# Patient Record
Sex: FEMALE | Race: BLACK OR AFRICAN AMERICAN | NOT HISPANIC OR LATINO
[De-identification: names, ages, dates, MRNs, and addresses within clinical notes are randomized per-mention and may not be internally consistent; named-entity substitution may affect disease eponyms.]

---

## 2017-01-11 ENCOUNTER — CHART COPY (OUTPATIENT)
Age: 37
End: 2017-01-11

## 2017-01-26 ENCOUNTER — FORM ENCOUNTER (OUTPATIENT)
Age: 37
End: 2017-01-26

## 2017-01-27 ENCOUNTER — APPOINTMENT (OUTPATIENT)
Dept: MRI IMAGING | Facility: IMAGING CENTER | Age: 37
End: 2017-01-27

## 2017-01-27 ENCOUNTER — OUTPATIENT (OUTPATIENT)
Dept: OUTPATIENT SERVICES | Facility: HOSPITAL | Age: 37
LOS: 1 days | End: 2017-01-27
Payer: COMMERCIAL

## 2017-01-27 DIAGNOSIS — D25.9 LEIOMYOMA OF UTERUS, UNSPECIFIED: ICD-10-CM

## 2017-01-27 PROCEDURE — A9585: CPT

## 2017-01-27 PROCEDURE — 72197 MRI PELVIS W/O & W/DYE: CPT

## 2017-02-07 ENCOUNTER — APPOINTMENT (OUTPATIENT)
Dept: INTERVENTIONAL RADIOLOGY/VASCULAR | Facility: CLINIC | Age: 37
End: 2017-02-07

## 2017-02-07 VITALS
BODY MASS INDEX: 25.03 KG/M2 | SYSTOLIC BLOOD PRESSURE: 122 MMHG | RESPIRATION RATE: 16 BRPM | OXYGEN SATURATION: 100 % | HEIGHT: 62 IN | HEART RATE: 68 BPM | WEIGHT: 136 LBS | DIASTOLIC BLOOD PRESSURE: 78 MMHG

## 2017-04-06 ENCOUNTER — OUTPATIENT (OUTPATIENT)
Dept: OUTPATIENT SERVICES | Facility: HOSPITAL | Age: 37
LOS: 1 days | End: 2017-04-06
Payer: COMMERCIAL

## 2017-04-06 VITALS
OXYGEN SATURATION: 100 % | SYSTOLIC BLOOD PRESSURE: 109 MMHG | RESPIRATION RATE: 16 BRPM | HEART RATE: 59 BPM | WEIGHT: 136.91 LBS | HEIGHT: 61 IN | DIASTOLIC BLOOD PRESSURE: 72 MMHG | TEMPERATURE: 98 F

## 2017-04-06 DIAGNOSIS — Z01.818 ENCOUNTER FOR OTHER PREPROCEDURAL EXAMINATION: ICD-10-CM

## 2017-04-06 DIAGNOSIS — D25.9 LEIOMYOMA OF UTERUS, UNSPECIFIED: ICD-10-CM

## 2017-04-06 DIAGNOSIS — Z98.891 HISTORY OF UTERINE SCAR FROM PREVIOUS SURGERY: Chronic | ICD-10-CM

## 2017-04-06 LAB
HCT VFR BLD CALC: 34.9 % — SIGNIFICANT CHANGE UP (ref 34.5–45)
HGB BLD-MCNC: 10.9 G/DL — LOW (ref 11.5–15.5)
MCHC RBC-ENTMCNC: 21.8 PG — LOW (ref 27–34)
MCHC RBC-ENTMCNC: 31.2 GM/DL — LOW (ref 32–36)
MCV RBC AUTO: 69.9 FL — LOW (ref 80–100)
PLATELET # BLD AUTO: 191 K/UL — SIGNIFICANT CHANGE UP (ref 150–400)
RBC # BLD: 4.99 M/UL — SIGNIFICANT CHANGE UP (ref 3.8–5.2)
RBC # FLD: 14.4 % — SIGNIFICANT CHANGE UP (ref 10.3–14.5)
WBC # BLD: 5.33 K/UL — SIGNIFICANT CHANGE UP (ref 3.8–10.5)
WBC # FLD AUTO: 5.33 K/UL — SIGNIFICANT CHANGE UP (ref 3.8–10.5)

## 2017-04-06 PROCEDURE — 85027 COMPLETE CBC AUTOMATED: CPT

## 2017-04-06 RX ORDER — SODIUM CHLORIDE 9 MG/ML
3 INJECTION INTRAMUSCULAR; INTRAVENOUS; SUBCUTANEOUS EVERY 8 HOURS
Qty: 0 | Refills: 0 | Status: DISCONTINUED | OUTPATIENT
Start: 2017-04-19 | End: 2017-05-04

## 2017-04-06 RX ORDER — LIDOCAINE HCL 20 MG/ML
0.2 VIAL (ML) INJECTION ONCE
Qty: 0 | Refills: 0 | Status: DISCONTINUED | OUTPATIENT
Start: 2017-04-19 | End: 2017-05-04

## 2017-04-06 RX ORDER — ACETAMINOPHEN 500 MG
975 TABLET ORAL ONCE
Qty: 0 | Refills: 0 | Status: DISCONTINUED | OUTPATIENT
Start: 2017-04-19 | End: 2017-05-04

## 2017-04-06 RX ORDER — CELECOXIB 200 MG/1
200 CAPSULE ORAL ONCE
Qty: 0 | Refills: 0 | Status: DISCONTINUED | OUTPATIENT
Start: 2017-04-19 | End: 2017-05-04

## 2017-04-06 NOTE — H&P PST ADULT - ATTENDING COMMENTS
Pt and  were counseled re resection of submucosal myoma and D+C, using operative hysteroscopy, Symphion system.  r/b/a's of these procedures, including ut perforation, injury to the cervix or uterus, injury to the intra-abd organs or viscerae, infection, excessive bleeding, scarring or Asherman syndrome all discussed and q's answered.  Pt and  undertand and agree.  consents signed.

## 2017-04-06 NOTE — H&P PST ADULT - HISTORY OF PRESENT ILLNESS
This is a 35 y/o female dx: Uterine fibroid. Scheduled: Resection of Submucoal  Fibroid/ Operative Hysteroscopy with Symphion.

## 2017-04-06 NOTE — H&P PST ADULT - REASON FOR ADMISSION
" I have uterine fibroid. I'm trying to conceive" " I have a uterine fibroid. I'm trying to conceive"

## 2017-04-11 ENCOUNTER — APPOINTMENT (OUTPATIENT)
Dept: OPHTHALMOLOGY | Facility: CLINIC | Age: 37
End: 2017-04-11

## 2017-04-18 ENCOUNTER — RESULT REVIEW (OUTPATIENT)
Age: 37
End: 2017-04-18

## 2017-04-19 ENCOUNTER — OUTPATIENT (OUTPATIENT)
Dept: OUTPATIENT SERVICES | Facility: HOSPITAL | Age: 37
LOS: 1 days | End: 2017-04-19
Payer: COMMERCIAL

## 2017-04-19 VITALS
WEIGHT: 136.91 LBS | DIASTOLIC BLOOD PRESSURE: 72 MMHG | OXYGEN SATURATION: 100 % | HEIGHT: 61 IN | TEMPERATURE: 99 F | SYSTOLIC BLOOD PRESSURE: 109 MMHG | HEART RATE: 73 BPM | RESPIRATION RATE: 16 BRPM

## 2017-04-19 VITALS
DIASTOLIC BLOOD PRESSURE: 65 MMHG | OXYGEN SATURATION: 100 % | SYSTOLIC BLOOD PRESSURE: 112 MMHG | RESPIRATION RATE: 18 BRPM | HEART RATE: 61 BPM

## 2017-04-19 DIAGNOSIS — D25.9 LEIOMYOMA OF UTERUS, UNSPECIFIED: ICD-10-CM

## 2017-04-19 DIAGNOSIS — Z98.891 HISTORY OF UTERINE SCAR FROM PREVIOUS SURGERY: Chronic | ICD-10-CM

## 2017-04-19 PROCEDURE — 88305 TISSUE EXAM BY PATHOLOGIST: CPT

## 2017-04-19 PROCEDURE — 88305 TISSUE EXAM BY PATHOLOGIST: CPT | Mod: 26

## 2017-04-19 PROCEDURE — 58561 HYSTEROSCOPY REMOVE MYOMA: CPT

## 2017-04-19 RX ORDER — OXYCODONE HYDROCHLORIDE 5 MG/1
5 TABLET ORAL ONCE
Qty: 0 | Refills: 0 | Status: DISCONTINUED | OUTPATIENT
Start: 2017-04-19 | End: 2017-04-19

## 2017-04-19 RX ORDER — FERROUS SULFATE 325(65) MG
1 TABLET ORAL
Qty: 0 | Refills: 0 | COMMUNITY

## 2017-04-19 RX ORDER — FAMOTIDINE 10 MG/ML
0 INJECTION INTRAVENOUS
Qty: 0 | Refills: 0 | COMMUNITY

## 2017-04-19 RX ORDER — CELECOXIB 200 MG/1
200 CAPSULE ORAL ONCE
Qty: 0 | Refills: 0 | Status: DISCONTINUED | OUTPATIENT
Start: 2017-04-19 | End: 2017-05-04

## 2017-04-19 RX ORDER — SODIUM CHLORIDE 9 MG/ML
1000 INJECTION, SOLUTION INTRAVENOUS
Qty: 0 | Refills: 0 | Status: DISCONTINUED | OUTPATIENT
Start: 2017-04-19 | End: 2017-05-04

## 2017-04-19 RX ORDER — PREGABALIN 225 MG/1
1 CAPSULE ORAL
Qty: 0 | Refills: 0 | COMMUNITY

## 2017-04-19 RX ORDER — FOLIC ACID 0.8 MG
1 TABLET ORAL
Qty: 0 | Refills: 0 | COMMUNITY

## 2017-04-19 RX ORDER — CHOLECALCIFEROL (VITAMIN D3) 125 MCG
1 CAPSULE ORAL
Qty: 0 | Refills: 0 | COMMUNITY

## 2017-04-19 RX ORDER — ONDANSETRON 8 MG/1
4 TABLET, FILM COATED ORAL ONCE
Qty: 0 | Refills: 0 | Status: DISCONTINUED | OUTPATIENT
Start: 2017-04-19 | End: 2017-05-04

## 2017-04-19 NOTE — ASU DISCHARGE PLAN (ADULT/PEDIATRIC). - ACTIVITY LEVEL
nothing per vagina/no tub baths/no tampons/no heavy lifting/no douching/avoid above activities for 2 weeks/no sports/gym/no intercourse

## 2017-04-19 NOTE — ASU DISCHARGE PLAN (ADULT/PEDIATRIC). - NOTIFY
Pain not relieved by Medications/Unable to Urinate/Persistent Nausea and Vomiting/Bleeding that does not stop/Increased Irritability or Sluggishness/GYN Fever>100.4/Inability to Tolerate Liquids or Foods

## 2017-04-19 NOTE — BRIEF OPERATIVE NOTE - OPERATION/FINDINGS
normal external genital organs, vagina and cervix.  Retroverted normal size uterus.  normal adnexal size on b/m exam.   2.5-3cm submucosal myoma noted to arise from posterior aspect of uterus.

## 2017-04-19 NOTE — BRIEF OPERATIVE NOTE - PROCEDURE
Dilatation & curettage  04/19/2017    Active  ADAVIDSO  Myomectomy, uterine, hysteroscopic  04/19/2017    Active  ADAVIDSO

## 2017-04-19 NOTE — ASU DISCHARGE PLAN (ADULT/PEDIATRIC). - MEDICATION SUMMARY - MEDICATIONS TO TAKE
I will START or STAY ON the medications listed below when I get home from the hospital:    ibuprofen 200 mg oral capsule  -- up to 3 cap(s) by mouth every 6 hours, As Needed  -- Indication: For for pain, as needed    acetaminophen 500 mg oral capsule  -- Up to 2 caps / tablets, by mouth , As Needed  -- Indication: For for pain, as needed    multivitamin  -- 1 tab(s) by mouth once a day: Last dose is 4-12-17  -- Indication: For vitamins

## 2017-04-24 LAB — SURGICAL PATHOLOGY STUDY: SIGNIFICANT CHANGE UP

## 2017-05-11 NOTE — H&P PST ADULT - PROBLEM/PLAN-1
Called and spoke to the patient. He prefers to just work with Dr. Haskins. (Appt in place for next week.) This patient no longer wishes to continue following-up with [facility]. We will stop reaching out to the patient at this time. Please let us know if we can assist in this patient's care in the future.    Routing to PCP as REGGIE Hatch, PharmD  [place] [facility] Resident  [phone]    
DISPLAY PLAN FREE TEXT

## 2017-05-15 ENCOUNTER — TRANSCRIPTION ENCOUNTER (OUTPATIENT)
Age: 37
End: 2017-05-15

## 2018-05-10 ENCOUNTER — APPOINTMENT (OUTPATIENT)
Dept: OPHTHALMOLOGY | Facility: CLINIC | Age: 38
End: 2018-05-10
Payer: COMMERCIAL

## 2018-05-10 PROCEDURE — 92015 DETERMINE REFRACTIVE STATE: CPT

## 2018-05-10 PROCEDURE — 92012 INTRM OPH EXAM EST PATIENT: CPT

## 2020-01-24 ENCOUNTER — RESULT REVIEW (OUTPATIENT)
Age: 40
End: 2020-01-24

## 2021-01-27 PROBLEM — E55.9 VITAMIN D DEFICIENCY, UNSPECIFIED: Chronic | Status: ACTIVE | Noted: 2017-04-06

## 2021-01-27 PROBLEM — D50.9 IRON DEFICIENCY ANEMIA, UNSPECIFIED: Chronic | Status: ACTIVE | Noted: 2017-04-06

## 2021-01-27 PROBLEM — D25.9 LEIOMYOMA OF UTERUS, UNSPECIFIED: Chronic | Status: ACTIVE | Noted: 2017-04-06

## 2021-02-17 ENCOUNTER — ASOB RESULT (OUTPATIENT)
Age: 41
End: 2021-02-17

## 2021-02-17 ENCOUNTER — APPOINTMENT (OUTPATIENT)
Dept: ANTEPARTUM | Facility: CLINIC | Age: 41
End: 2021-02-17
Payer: COMMERCIAL

## 2021-02-17 PROCEDURE — 99072 ADDL SUPL MATRL&STAF TM PHE: CPT

## 2021-02-17 PROCEDURE — 76801 OB US < 14 WKS SINGLE FETUS: CPT

## 2021-02-17 PROCEDURE — 76813 OB US NUCHAL MEAS 1 GEST: CPT | Mod: 59

## 2021-03-17 ENCOUNTER — APPOINTMENT (OUTPATIENT)
Dept: ANTEPARTUM | Facility: CLINIC | Age: 41
End: 2021-03-17
Payer: COMMERCIAL

## 2021-03-17 ENCOUNTER — INPATIENT (INPATIENT)
Facility: HOSPITAL | Age: 41
LOS: 1 days | Discharge: HOME | DRG: 831 | End: 2021-03-19
Attending: OBSTETRICS & GYNECOLOGY | Admitting: OBSTETRICS & GYNECOLOGY
Payer: COMMERCIAL

## 2021-03-17 ENCOUNTER — TRANSCRIPTION ENCOUNTER (OUTPATIENT)
Age: 41
End: 2021-03-17

## 2021-03-17 ENCOUNTER — ASOB RESULT (OUTPATIENT)
Age: 41
End: 2021-03-17

## 2021-03-17 VITALS — DIASTOLIC BLOOD PRESSURE: 66 MMHG | SYSTOLIC BLOOD PRESSURE: 126 MMHG | HEART RATE: 125 BPM

## 2021-03-17 DIAGNOSIS — O26.899 OTHER SPECIFIED PREGNANCY RELATED CONDITIONS, UNSPECIFIED TRIMESTER: ICD-10-CM

## 2021-03-17 DIAGNOSIS — Z3A.16 16 WEEKS GESTATION OF PREGNANCY: ICD-10-CM

## 2021-03-17 DIAGNOSIS — Z98.891 HISTORY OF UTERINE SCAR FROM PREVIOUS SURGERY: Chronic | ICD-10-CM

## 2021-03-17 DIAGNOSIS — Z3A.00 WEEKS OF GESTATION OF PREGNANCY NOT SPECIFIED: ICD-10-CM

## 2021-03-17 DIAGNOSIS — Z34.80 ENCOUNTER FOR SUPERVISION OF OTHER NORMAL PREGNANCY, UNSPECIFIED TRIMESTER: ICD-10-CM

## 2021-03-17 LAB
ALBUMIN SERPL ELPH-MCNC: 4.3 G/DL — SIGNIFICANT CHANGE UP (ref 3.3–5)
ALP SERPL-CCNC: 54 U/L — SIGNIFICANT CHANGE UP (ref 40–120)
ALT FLD-CCNC: 13 U/L — SIGNIFICANT CHANGE UP (ref 10–45)
ANION GAP SERPL CALC-SCNC: 15 MMOL/L — SIGNIFICANT CHANGE UP (ref 5–17)
AST SERPL-CCNC: 20 U/L — SIGNIFICANT CHANGE UP (ref 10–40)
BASOPHILS # BLD AUTO: 0 K/UL — SIGNIFICANT CHANGE UP (ref 0–0.2)
BASOPHILS NFR BLD AUTO: 0 % — SIGNIFICANT CHANGE UP (ref 0–2)
BILIRUB SERPL-MCNC: 0.7 MG/DL — SIGNIFICANT CHANGE UP (ref 0.2–1.2)
BUN SERPL-MCNC: 8 MG/DL — SIGNIFICANT CHANGE UP (ref 7–23)
CALCIUM SERPL-MCNC: 10.2 MG/DL — SIGNIFICANT CHANGE UP (ref 8.4–10.5)
CHLORIDE SERPL-SCNC: 102 MMOL/L — SIGNIFICANT CHANGE UP (ref 96–108)
CO2 SERPL-SCNC: 18 MMOL/L — LOW (ref 22–31)
CREAT SERPL-MCNC: 0.48 MG/DL — LOW (ref 0.5–1.3)
EOSINOPHIL # BLD AUTO: 0 K/UL — SIGNIFICANT CHANGE UP (ref 0–0.5)
EOSINOPHIL NFR BLD AUTO: 0 % — SIGNIFICANT CHANGE UP (ref 0–6)
GLUCOSE SERPL-MCNC: 85 MG/DL — SIGNIFICANT CHANGE UP (ref 70–99)
HCT VFR BLD CALC: 38.9 % — SIGNIFICANT CHANGE UP (ref 34.5–45)
HGB BLD-MCNC: 12.3 G/DL — SIGNIFICANT CHANGE UP (ref 11.5–15.5)
LYMPHOCYTES # BLD AUTO: 0.35 K/UL — LOW (ref 1–3.3)
LYMPHOCYTES # BLD AUTO: 3.5 % — LOW (ref 13–44)
MCHC RBC-ENTMCNC: 22.8 PG — LOW (ref 27–34)
MCHC RBC-ENTMCNC: 31.6 GM/DL — LOW (ref 32–36)
MCV RBC AUTO: 72 FL — LOW (ref 80–100)
MONOCYTES # BLD AUTO: 0.53 K/UL — SIGNIFICANT CHANGE UP (ref 0–0.9)
MONOCYTES NFR BLD AUTO: 5.3 % — SIGNIFICANT CHANGE UP (ref 2–14)
NEUTROPHILS # BLD AUTO: 9.15 K/UL — HIGH (ref 1.8–7.4)
NEUTROPHILS NFR BLD AUTO: 91.2 % — HIGH (ref 43–77)
PLATELET # BLD AUTO: 201 K/UL — SIGNIFICANT CHANGE UP (ref 150–400)
POTASSIUM SERPL-MCNC: 3.8 MMOL/L — SIGNIFICANT CHANGE UP (ref 3.5–5.3)
POTASSIUM SERPL-SCNC: 3.8 MMOL/L — SIGNIFICANT CHANGE UP (ref 3.5–5.3)
PROT SERPL-MCNC: 8 G/DL — SIGNIFICANT CHANGE UP (ref 6–8.3)
RBC # BLD: 5.4 M/UL — HIGH (ref 3.8–5.2)
RBC # FLD: 15.5 % — HIGH (ref 10.3–14.5)
SARS-COV-2 RNA SPEC QL NAA+PROBE: SIGNIFICANT CHANGE UP
SODIUM SERPL-SCNC: 135 MMOL/L — SIGNIFICANT CHANGE UP (ref 135–145)
WBC # BLD: 10.03 K/UL — SIGNIFICANT CHANGE UP (ref 3.8–10.5)
WBC # FLD AUTO: 10.03 K/UL — SIGNIFICANT CHANGE UP (ref 3.8–10.5)

## 2021-03-17 PROCEDURE — 76805 OB US >/= 14 WKS SNGL FETUS: CPT

## 2021-03-17 PROCEDURE — 99253 IP/OBS CNSLTJ NEW/EST LOW 45: CPT

## 2021-03-17 PROCEDURE — 99072 ADDL SUPL MATRL&STAF TM PHE: CPT

## 2021-03-17 RX ORDER — SODIUM CHLORIDE 9 MG/ML
1000 INJECTION, SOLUTION INTRAVENOUS
Refills: 0 | Status: DISCONTINUED | OUTPATIENT
Start: 2021-03-17 | End: 2021-03-17

## 2021-03-17 RX ORDER — CITRIC ACID/SODIUM CITRATE 300-500 MG
15 SOLUTION, ORAL ORAL EVERY 6 HOURS
Refills: 0 | Status: DISCONTINUED | OUTPATIENT
Start: 2021-03-17 | End: 2021-03-17

## 2021-03-17 RX ORDER — SODIUM CHLORIDE 9 MG/ML
1000 INJECTION, SOLUTION INTRAVENOUS
Refills: 0 | Status: DISCONTINUED | OUTPATIENT
Start: 2021-03-17 | End: 2021-03-18

## 2021-03-17 RX ORDER — OXYTOCIN 10 UNIT/ML
333.33 VIAL (ML) INJECTION
Qty: 20 | Refills: 0 | Status: DISCONTINUED | OUTPATIENT
Start: 2021-03-17 | End: 2021-03-18

## 2021-03-17 RX ADMIN — SODIUM CHLORIDE 125 MILLILITER(S): 9 INJECTION, SOLUTION INTRAVENOUS at 12:30

## 2021-03-17 NOTE — OB PROVIDER H&P - PROBLEM SELECTOR PLAN 1
A/P 41yo  @ 16w 2 d admitted with pprom/anhydramnios  - Admit to L & D/Labs/IVF/NPO  - MFM consulted  - Family Planning consulted  - Options discussed with patient regarding IOL vs D & E, pt is considering options and waiting for  prior to making her decision  D/W Dr Billy Shipley PA-C

## 2021-03-17 NOTE — OB PROVIDER H&P - HISTORY OF PRESENT ILLNESS
41yo  @ 16w 2 d presents from office for further evaluation secondary to new finding of anhydramnios in the office today.  Pt states that on monday she did have increased thin vaignal discharge after having a BM and since then has had to wear a pad bc she is having increased vaignal discharge.  Denies VB, crmaping or ctx , pt hasnt had FM this pregnancy yet.  Denies fever or chills or other complaints    PNC: Dr Velasco  PNI: uncomplicated  PNL: will obtain if patient admitted     All: NKDA  Meds: PNV, iron  PMHx: Denies  PSHx: C/S, vaginal myomectomy in 2017 and 2020  Socialhx: Denies x 3  OBhx: 2011  FT  C/S  arrest of dilation - 10lbs  GYNhx: + small fibriods per pt - stable, denies ov cysts or STDs    T(C): --  HR: 106 (03-17-21 @ 11:39) (106 - 126)  BP: 126/66 (03-17-21 @ 10:19) (126/66 - 126/66)  RR: --  SpO2: 100% (03-17-21 @ 11:39) (91% - 100%)    Gen: NAd  Abdomen: soft, NT  Ext: no calf tenderness      SSE: + pooling, + nitrazine, + ferning  umbilical cord cord seen protruding through the cervical os  BSUS by Dr Wyatt ivanx, posterior placenta, minimal fluid seen    A/P 41yo  @ 16w 2 d presents with pprom/anhydramnios  - Admit to L & D/Labs/IVF/NPO  - MFM consulted  - Family Planning consulted  D/W Dr Billy Shipley PA-C

## 2021-03-17 NOTE — OB RN PATIENT PROFILE - HEIGHT IN INCHES
Post Anesthetic Evaluation


Cardiovascular Status: Normal, Stable


Respiratory Status: Normal, Stable


Level of Consciousness/Mental Status: Can Participate in Eval


Pain Control: Adequate, Prn Tx Ordered


Nausea/Vomiting Control: Adequate, Prn Tx Ordered


Complications Possibly Related to Anesthesia: None Noted
2

## 2021-03-17 NOTE — OB RN TRIAGE NOTE - CURRENT PREGNANCY COMPLICATIONS, OB PROFILE
Incompetent Cervix/Cervical Insufficiency/ Premature Rupture of Membranes (PPROM)/Abnormal Amniotic Fluid Volume

## 2021-03-17 NOTE — OB PROVIDER H&P - ASSESSMENT
41yo  @ 16w 2 d presents from office for further evaluation secondary to new finding of anhydramnios in the office today.  Pt states that on monday she did have increased thin vaignal discharge after having a BM and since then has had to wear a pad bc she is having increased vaignal discharge.  Denies VB, crmaping or ctx , pt hasnt had FM this pregnancy yet.  Denies fever or chills or other complaints    PNC: Dr Velasco  PNI: uncomplicated  PNL: will obtain if patient admitted     All: NKDA  Meds: PNV, iron  PMHx: Denies  PSHx: C/S, vaginal myomectomy in 2017 and 2020  Socialhx: Denies x 3  OBhx: 2011  FT  C/S  arrest of dilation - 10lbs  GYNhx: + small fibriods per pt - stable, denies ov cysts or STDs    T(C): --  HR: 106 (03-17-21 @ 11:39) (106 - 126)  BP: 126/66 (03-17-21 @ 10:19) (126/66 - 126/66)  RR: --  SpO2: 100% (03-17-21 @ 11:39) (91% - 100%)    Gen: NAd  Abdomen: soft, NT  Ext: no calf tenderness      SSE: + pooling, + nitrazine, + ferning  umbilical cord cord seen protruding through the cervical os  BSUS by Dr Schneider  vtx, posterior placenta, minimal fluid seen

## 2021-03-17 NOTE — OB PROVIDER H&P - ATTENDING COMMENTS
Patient seen at bedside with Nori LIU and Dr. Schneider. Patient reports that she had an episode of feeling increased pressure when she had a BM. She felt a "pop" and noticed a small amount of mucous. Did not see copious fluid on her pad, only discharge. Patient went in for a routine visit and was diagnosed with anhydramnios. Patient was subsequently sent to L&D.     Bedside sono performed by Dr. Schneider confirmed anhydramnios. Spec exam repeated and confirmed cervix appeared at least 1cm dilated with longer segment of umbilical cord in the vagina.     Discussed with patient that pregnancy is currently not viable and she is at risk of infection now that she has rupture of membranes. Patient also counseled that the fetus would not reach viability for 2 months, but lungs are unlikely to develop due to lack of amniotic fluid. Recommend interruption of pregnancy.     Patient counseled on labor induction vs. D&E. Counseled patient on procedure for labor induction and D&E. Patient does not desire to see the fetus and desires to proceed with D&E. Dr. Heredia consulted, who agrees to see patient tomorrow morning for planned D&E tomorrow at 6pm. Patient seen at bedside with Nori LIU and Dr. Schneider. Patient reports that she had an episode of feeling increased pressure when she had a BM. She felt a "pop" and noticed a small amount of mucous. Did not see copious fluid on her pad, only discharge. Patient went in for a routine visit and was diagnosed with anhydramnios. Patient was subsequently sent to L&D.     Bedside sono performed by Dr. Schneider confirmed anhydramnios. Spec exam repeated and confirmed cervix appeared at least 1cm dilated with longer segment of umbilical cord in the vagina.     Discussed with patient that pregnancy is currently not viable and she is at risk of infection now that she has rupture of membranes. Patient also counseled that the fetus would not reach viability for 2 months, but lungs are unlikely to develop due to lack of amniotic fluid. Recommend interruption of pregnancy.     Patient counseled on labor induction vs. D&E. Counseled patient on procedure for labor induction and D&E. Patient does not desire to see the fetus and desires to proceed with D&E. Labs reviewed, WBC 10 and patient has no signs of labor or chorioamnionitis. Dr. Heredia consulted, who agrees to see patient tomorrow morning for planned D&E tomorrow at 6pm. Patient to stay inpatient on 3KN in anticipation of procedure tomorrow.      SHAYY Cervantes MD

## 2021-03-18 ENCOUNTER — APPOINTMENT (OUTPATIENT)
Dept: OBGYN | Facility: CLINIC | Age: 41
End: 2021-03-18

## 2021-03-18 ENCOUNTER — TRANSCRIPTION ENCOUNTER (OUTPATIENT)
Age: 41
End: 2021-03-18

## 2021-03-18 ENCOUNTER — NON-APPOINTMENT (OUTPATIENT)
Age: 41
End: 2021-03-18

## 2021-03-18 LAB
HCT VFR BLD CALC: 30.4 % — LOW (ref 34.5–45)
HGB BLD-MCNC: 9.8 G/DL — LOW (ref 11.5–15.5)
MCHC RBC-ENTMCNC: 23.1 PG — LOW (ref 27–34)
MCHC RBC-ENTMCNC: 32.2 GM/DL — SIGNIFICANT CHANGE UP (ref 32–36)
MCV RBC AUTO: 71.5 FL — LOW (ref 80–100)
NRBC # BLD: 0 /100 WBCS — SIGNIFICANT CHANGE UP (ref 0–0)
PLATELET # BLD AUTO: 185 K/UL — SIGNIFICANT CHANGE UP (ref 150–400)
RBC # BLD: 4.25 M/UL — SIGNIFICANT CHANGE UP (ref 3.8–5.2)
RBC # FLD: 15.6 % — HIGH (ref 10.3–14.5)
T PALLIDUM AB TITR SER: NEGATIVE — SIGNIFICANT CHANGE UP
WBC # BLD: 7.78 K/UL — SIGNIFICANT CHANGE UP (ref 3.8–10.5)
WBC # FLD AUTO: 7.78 K/UL — SIGNIFICANT CHANGE UP (ref 3.8–10.5)

## 2021-03-18 PROCEDURE — 76998 US GUIDE INTRAOP: CPT | Mod: 26

## 2021-03-18 PROCEDURE — 59841 INDUCED ABORTION DILAT&EVAC: CPT

## 2021-03-18 PROCEDURE — 88305 TISSUE EXAM BY PATHOLOGIST: CPT | Mod: 26

## 2021-03-18 PROCEDURE — 99231 SBSQ HOSP IP/OBS SF/LOW 25: CPT | Mod: 25

## 2021-03-18 RX ORDER — HYDROMORPHONE HYDROCHLORIDE 2 MG/ML
0.5 INJECTION INTRAMUSCULAR; INTRAVENOUS; SUBCUTANEOUS
Refills: 0 | Status: DISCONTINUED | OUTPATIENT
Start: 2021-03-18 | End: 2021-03-18

## 2021-03-18 RX ORDER — SODIUM CHLORIDE 9 MG/ML
1000 INJECTION, SOLUTION INTRAVENOUS
Refills: 0 | Status: DISCONTINUED | OUTPATIENT
Start: 2021-03-18 | End: 2021-03-19

## 2021-03-18 RX ORDER — DIPHENOXYLATE HCL/ATROPINE 2.5-.025MG
2 TABLET ORAL ONCE
Refills: 0 | Status: DISCONTINUED | OUTPATIENT
Start: 2021-03-18 | End: 2021-03-19

## 2021-03-18 RX ORDER — MIFEPRISTONE 300 MG/1
200 TABLET, FILM COATED ORAL ONCE
Refills: 0 | Status: COMPLETED | OUTPATIENT
Start: 2021-03-18 | End: 2021-03-18

## 2021-03-18 RX ORDER — ONDANSETRON 8 MG/1
4 TABLET, FILM COATED ORAL ONCE
Refills: 0 | Status: DISCONTINUED | OUTPATIENT
Start: 2021-03-18 | End: 2021-03-18

## 2021-03-18 RX ORDER — ACETAMINOPHEN 500 MG
1000 TABLET ORAL ONCE
Refills: 0 | Status: COMPLETED | OUTPATIENT
Start: 2021-03-18 | End: 2021-03-18

## 2021-03-18 RX ORDER — DIPHENHYDRAMINE HCL 50 MG
25 CAPSULE ORAL ONCE
Refills: 0 | Status: COMPLETED | OUTPATIENT
Start: 2021-03-18 | End: 2021-03-18

## 2021-03-18 RX ADMIN — HYDROMORPHONE HYDROCHLORIDE 0.5 MILLIGRAM(S): 2 INJECTION INTRAMUSCULAR; INTRAVENOUS; SUBCUTANEOUS at 21:00

## 2021-03-18 RX ADMIN — MIFEPRISTONE 200 MILLIGRAM(S): 300 TABLET, FILM COATED ORAL at 09:24

## 2021-03-18 RX ADMIN — HYDROMORPHONE HYDROCHLORIDE 0.5 MILLIGRAM(S): 2 INJECTION INTRAMUSCULAR; INTRAVENOUS; SUBCUTANEOUS at 21:10

## 2021-03-18 RX ADMIN — HYDROMORPHONE HYDROCHLORIDE 0.5 MILLIGRAM(S): 2 INJECTION INTRAMUSCULAR; INTRAVENOUS; SUBCUTANEOUS at 20:41

## 2021-03-18 RX ADMIN — SODIUM CHLORIDE 125 MILLILITER(S): 9 INJECTION, SOLUTION INTRAVENOUS at 21:03

## 2021-03-18 RX ADMIN — SODIUM CHLORIDE 125 MILLILITER(S): 9 INJECTION, SOLUTION INTRAVENOUS at 16:55

## 2021-03-18 RX ADMIN — Medication 25 MILLIGRAM(S): at 01:24

## 2021-03-18 RX ADMIN — HYDROMORPHONE HYDROCHLORIDE 0.5 MILLIGRAM(S): 2 INJECTION INTRAMUSCULAR; INTRAVENOUS; SUBCUTANEOUS at 20:58

## 2021-03-18 RX ADMIN — SODIUM CHLORIDE 125 MILLILITER(S): 9 INJECTION, SOLUTION INTRAVENOUS at 08:45

## 2021-03-18 RX ADMIN — SODIUM CHLORIDE 125 MILLILITER(S): 9 INJECTION, SOLUTION INTRAVENOUS at 00:28

## 2021-03-18 RX ADMIN — HYDROMORPHONE HYDROCHLORIDE 0.5 MILLIGRAM(S): 2 INJECTION INTRAMUSCULAR; INTRAVENOUS; SUBCUTANEOUS at 21:26

## 2021-03-18 NOTE — DISCHARGE NOTE PROVIDER - NSDCMRMEDTOKEN_GEN_ALL_CORE_FT
acetaminophen 500 mg oral capsule: Up to 2 caps / tablets, orally , As Needed  ibuprofen 200 mg oral capsule: up to 3 cap(s) orally every 6 hours, As Needed  multivitamin: 1 tab(s) orally once a day: Last dose is 4-12-17

## 2021-03-18 NOTE — BRIEF OPERATIVE NOTE - NSICDXBRIEFPREOP_GEN_ALL_CORE_FT
PRE-OP DIAGNOSIS:  16 weeks gestation of pregnancy 18-Mar-2021 20:34:36 16 weeks gestation of pregnancy PPROM Colby Grijalva   PRE-OP DIAGNOSIS:   premature rupture of membranes (PPROM) with unknown onset of labor 19-Mar-2021 19:10:45 1. IUP @ 16 3/7 weeks  2. PPROM Alysa Heredia

## 2021-03-18 NOTE — DISCHARGE NOTE PROVIDER - CARE PROVIDER_API CALL
Gaudencio Khan)  Obstetrics and Gynecology  [address] [facility], [address]  [place] [address]  Phone: [phone]  Fax: [phone]  Follow Up Time: 2 weeks

## 2021-03-18 NOTE — PROGRESS NOTE ADULT - ASSESSMENT
A/P: 39 y/o  at 16w3d admitted with PPROM yesterday. VSS overnight. No evidence of infection or labor at this time. Patient awaiting D&E for management of previable PPROM.    Neuro: Analgesia PRN  CV: Hemodynamically stable, 2u PRBCs on hold  Pulm: O2 sat wnl on RA  GI: NPO for OR  : Voiding spontaneously  Heme: DVT ppx: Ambulation, SCDs while in bed, HSQ held pre-operatively  ID: Afebrile, No signs of infection/labor, f/u Vaginal Cx (3/17) recommended by MFM  FEN: LR@125, Replete electrolytes PRN   Dispo: OR for D&E    JOSIAS Brownlee PGY3 A/P: 41 y/o  at 16w3d admitted with PPROM yesterday. VSS overnight. No evidence of infection or labor at this time. Patient awaiting D&E for management of previable PPROM.    Neuro: Analgesia PRN  CV: Hemodynamically stable, 2u PRBCs on hold  Pulm: O2 sat wnl on RA  GI: NPO for OR  : Voiding spontaneously  Heme: DVT ppx: Ambulation, SCDs while in bed, HSQ held pre-operatively. Rh status: positive  ID: Afebrile, No signs of infection/labor, f/u G/C urine, f/u Vaginal Cx (3/17) recommended by MFM  FEN: LR@125, Replete electrolytes PRN   Dispo: OR for D&E    JOSIAS Brownlee PGY3

## 2021-03-18 NOTE — DISCHARGE NOTE PROVIDER - NSDCCPCAREPLAN_GEN_ALL_CORE_FT
PRINCIPAL DISCHARGE DIAGNOSIS  Diagnosis: 16 weeks gestation of pregnancy  Assessment and Plan of Treatment: 16 weeks gestation of pregnancy with PPROM

## 2021-03-18 NOTE — CHART NOTE - NSCHARTNOTEFT_GEN_A_CORE
introduced to patient's partner.  reviewed days events with patient and her partner.  genetics consents signed.  all questions/concerns of patient and her partner.  proceeding to OR.   patient received mifepristone 200 mg this morning.    Alysa Heredia MD introduced to patient's partner.  reviewed days events with patient and her partner.  genetics consents signed.  confirmed GC/CT negative from Dr. Cervantes's office  all questions/concerns of patient and her partner.  proceeding to OR.   patient received mifepristone 200 mg this morning.    Alysa Heredia MD

## 2021-03-18 NOTE — CONSULT NOTE ADULT - ASSESSMENT
39 yo  @ 16 3/7 weeks with PPROM requesting D+E for pregnancy termination  1.Dilation and Evacuation   -All available records and ultrasounds have been reviewed   - MFM u/s showed posterior placenta  - Pt does not desire induction of labor  - reviewed patient’s responsibility to contact insurance company for coverage confirmation  -All consents signed today, all questions/concerns addressed  - Patient offered pamphlet for support services - social work folder given to patient yesterday  - Patient offered fetal footprints - pt requesting  - Genetics - pt desires  - Reviewed disposal of remains, hospital vs. private burial.  Patient desires - hospital disposal    2. Surgery scheduling  - Patient to be precertified for D+E  - D+E scheduled for 6pm    3. ID/Cervical prep  - to get Gc/CT results from private office  - Reviewed Ibuprofen 600 mg po q 6 hours     4. Labs/Blood type  - to get CBC, T+S, at PST’s  - Rhogam pending results    5. Contraception  - Patient counseled on all contraceptive options  - recommend condoms in interime    6. Post-op  - Post-operative phone call virtual visit to be scheduled in 2 weeks  - Post-operative instruction sheet given, reviewed bleeding and infection precautions  - Provided 24 hour contact phone number  - All questions/concerns of patient addressed to their satisfaction

## 2021-03-18 NOTE — BRIEF OPERATIVE NOTE - NSICDXBRIEFPOSTOP_GEN_ALL_CORE_FT
POST-OP DIAGNOSIS:  16 weeks gestation of pregnancy 18-Mar-2021 20:34:43 PPROM Colby Grijalva   POST-OP DIAGNOSIS:   premature rupture of membranes (PPROM) with unknown onset of labor 19-Mar-2021 19:11:06  Alysa Heredia

## 2021-03-18 NOTE — DISCHARGE NOTE PROVIDER - HOSPITAL COURSE
39 y/o  at 16w3d admitted with PPROM now s/p Dilation and Evacuation With Ultrasound Guidance. , see operative note for details. Upon discharge, patient was ambulating, voiding spontaneously and pain was well controlled.

## 2021-03-18 NOTE — BRIEF OPERATIVE NOTE - OPERATION/FINDINGS
16 week myomatous uterus on examination  All fetal parts accounted for specific for gestational age  TXA, Methergine, Hemabate, and pitocin given for uterine tone  Thin endometrial stripe seen on ultrasound after complete of case 16 week anteverted, myomatous uterus. TXA, Methergine, Hemabate, and pitocin administered. fetus and placenta AGA, all fetal parts accounted for.  BT: O+

## 2021-03-18 NOTE — DISCHARGE NOTE PROVIDER - NSDCFUADDINST_GEN_ALL_CORE_FT
Regular diet as tolerated, regular activity as tolerated, no heavy lifting for first two weeks.  Nothing per vagina: no intercourse, tampons or douching. No submerging. Call your provider if you experience fevers, chills, worsening abdominal pain, inability to urinate or worsening vaginal bleeding.  Follow up with your provider in 2 weeks.

## 2021-03-18 NOTE — CONSULT NOTE ADULT - SUBJECTIVE AND OBJECTIVE BOX
39 yo  @ 16 3/7 weeks s/p PPROM 3/17/21.  Introduced to patient.  on exam yesterday, cervix 1 cm dilated.  placenta posterior    all: NKDA  meds: PNV    Obhx:  2011 FT c/s for failure to dilate, arrest 10lb    GYNhx:   vaginal myomectomy  2017: vaginal myomectomy  denies abnl pap smears  denies cysts/endometriosis  denies STIs    surghx  c/s, vaginal myomectomy x2    famh: mother, father HTN    D+E Counseling    Options for the pregnancy were discussed with the patient, including continuation of pregnancy, labor induction, and dilation and evacuation (D&E). Given the extremely poor prognosis for fetal outcome, the patient would prefer not to continue the pregnancy.  They do not desire a labor induction and are requesting a D&E.  Patient aware specimen does not come out intact.  Risks of D&E includin.	Infection: Patient was counseled on risk of infection and the use of prophylactic antibiotics, signs/symptoms of pre- and post-operative infection were reviewed.   2.	Hemorrhage: Patient was counseled on the risk of hemorrhage, possibly requiring blood (and/or blood products) transfusion, management including use of uterotonic medications, possible use of uterine balloon tamponade, possible use of interventional radiology uterine artery embolization, and possible hysterectomy.  If patient has had prior surgeries, their risk of injury to abdominopelvic organs associated with hysterectomy increases.  3.	Trauma: Patient was counseled on the risk of trauma to vagina, cervix, fallopian tubes, ovaries or uterus, possibly requiring laparoscopy, laparotomy, abdomino-pelvic organ repair and/or removal of affected organ(s).  This includes possible hysterectomy and was reviewed in detail.  The patient was counseled on the small risk of uterine perforation and the risk of injury to the vagina, cervix, uterus, fallopian tubes or ovaries, rectum, bowel (small and large intestine), bladder, ureters, pelvic nerves and blood vessels.    The patient was also counseled on the small risk of the need for further surgery (small risk of possible retained products of conception, small risk of scar tissue inside the uterus) and of the risk, as with any surgical procedure, of death.    The risk of harm to subsequent pregnancies or the ability to carry a subsequent pregnancy to term, and adverse psychological effects were discussed.      Need for cervical ripening with misoprostol, mifepristone, pre-operative laminaria placement, and administration of intra-amniotic or intra-fetal KCl or digoxin were also discussed; the accompanying risks of infection, bleeding, injury, rupture of membranes, and  labor were reviewed. The risks of delivery of a severely premature infant were reviewed.  They understand these risks and agrees to above.  The patient does have any underlying medical conditions that would increase her risks associated with the procedure. These conditions are: advanced maternal age, vaginal myomectomies, prior  section, PPROM. The additional risks of the procedure are: increased risk of infection, hemorrhage and trauma.    The patient also understands it is their responsibility to bring to the attention of their physician any unusual symptoms following the  and to report to follow-up examinations.      They are sure of their decision and deny any coercion from family, friends or healthcare providers. The patient had the opportunity to ask questions and all questions were answered.

## 2021-03-18 NOTE — BRIEF OPERATIVE NOTE - NSICDXBRIEFPROCEDURE_GEN_ALL_CORE_FT
PROCEDURES:  Induced  by dilation and evacuation 18-Mar-2021 20:31:17  Colby Grijalva   PROCEDURES:  Induced  by dilation and evacuation 18-Mar-2021 20:31:17 1. examination under anesthesia  2. standard dilation and evacuation under ultrasound guidance Colby Grijalva

## 2021-03-18 NOTE — PRE-ANESTHESIA EVALUATION ADULT - SPO2 (%)
Subjective   Patient ID: Leopoldo Murrieta is a 81year old female. Chief Complaint   Patient presents with   â¢ Knee     knees   â¢ Eye Problem   â¢ Convey Results     HPI        c/o chronic knees pain and stiffness; R>L  Had steroid injections and it did not help  takes Tramadol and it helps a little  She does not want surgery  ambulates with cane  No previous  falls  Â     Glaucoma   able to get around with cane residue vision on left eye and legally blind on right eye  on multiple medications, use eyedrop regularly  sees ophthalmologist  [place]  Â   Hypertension  controlled with low Na diet  no CP or SOB  Â   Hyperlipidemia  on low cholesterol diet  exercising in [employer] 2x a week  BS is 95, which is normal  Â   Hx of left Breast cancer/lumpectomy 7 y ago  has intermittent, chronic sharp pain in one or another breasts  had dx mammogram in July, 2018  not on hormonal th  sees oncologist  No palpitation or pain      Mild, intermittent acid reflux. Monitoring diet and takes otc antacids OTC    Itchiness in vaginal area (outside)  No rashes. No discharge. Â     Â   c/o increased urinary frequency and urgency, chronic. Normal urine appearance, no pain with urination. Does not need any med. for that. Improved since last visit     Obesity  lost 6 p since last visit  monitoring diet     doing exercise in [place] once a week          Leopoldo Murrieta has a past medical history of Breast cancer (CMS/[facility]), Glaucoma, History of lumpectomy of right breast, cholecystectomy, and Osteoarthritis of both knees. Leopoldo Murrieta does not have a problem list on file. Leopoldo Murrieta has a past surgical history that includes Cholecystectomy; Breast lumpectomy (Left); glaucoma surg,iridencleisis (Right); and Breast lumpectomy (Left). Her family history includes Diabetes in her maternal aunt, maternal uncle, and mother; Ophthalmology in her father. Leopoldo Murrieta reports that  has never smoked.  she has never used smokeless tobacco. She reports that she drinks alcohol. She reports that she does not use drugs. Sandrine Young has a current medication list which includes the following prescription(s): brimonidine, acetazolamide, bimatoprost, difluprednate, pilocarpine, and acetaminophen. Tara   Current Outpatient Medications   Medication Sig Dispense Refill   â¢ brimonidine (ALPHAGAN P) 0.1 % ophthalmic solution      â¢ acetaZOLAMIDE (DIAMOX) 250 MG tablet      â¢ bimatoprost (LUMIGAN) 0.01 % ophthalmic solution      â¢ difluprednate (DUREZOL) 0.05 % ophthalmic emulsion      â¢ pilocarpine (ISOPTO CARPINE) 1 % ophthalmic solution      â¢ acetaminophen (TYLENOL) 500 MG tablet Take 1 tablet by mouth 3 times daily as needed for Pain. 90 tablet 3     No current facility-administered medications for this visit. Sandrine Young is allergic to iodine   (environmental or med). Review of Systems   Musculoskeletal: Positive for arthralgias (both knee). All other systems reviewed and are negative. Objective   Physical Exam   Constitutional: She is oriented to person, place, and time. She appears well-developed and well-nourished. No distress. Obese; urine odor   HENT:   Head: Atraumatic. Nose: Nose normal.   Mouth/Throat: Oropharynx is clear and moist.   Eyes: Conjunctivae are normal. Pupils are equal, round, and reactive to light. Right eye exhibits no discharge. Left eye exhibits no discharge. No scleral icterus. Neck: Neck supple. No JVD present. No thyromegaly present. Cardiovascular: Normal rate, regular rhythm, normal heart sounds and intact distal pulses. Exam reveals no gallop and no friction rub. No murmur heard. Pulmonary/Chest: Effort normal and breath sounds normal. No respiratory distress. She has no wheezes. She has no rales. She exhibits no tenderness. Scan due to lumpectomy on left breat   Abdominal: Soft. Bowel sounds are normal. She exhibits no distension and no mass. There is no tenderness. There is no rebound and no guarding. No hernia. Musculoskeletal: She exhibits no edema, tenderness or deformity. Right knee: She exhibits decreased range of motion. Left knee: She exhibits decreased range of motion. DJD  In knees   Lymphadenopathy:     She has no cervical adenopathy. Neurological: She is alert and oriented to person, place, and time. No cranial nerve deficit or sensory deficit. She exhibits normal muscle tone. Coordination normal.   Skin: No rash noted. She is not diaphoretic. No erythema. Psychiatric: She has a normal mood and affect. Her behavior is normal.   Vitals reviewed.        Orders Only on 01/14/2019   Component Date Value Ref Range Status   â¢ FASTING STATUS 01/14/2019 UNK  hrs Final   â¢ CHOLESTEROL 01/14/2019 199  <200 mg/dL Final    Comment:    Desirable            <200  Borderline High      200 to 239  High                 >=240        â¢ CALCULATED LDL 01/14/2019 103  <130 mg/dL Final    Comment: OPTIMAL               <100  NEAR OPTIMAL          100-129  BORDERLINE HIGH       130-159  HIGH                  160-189  VERY HIGH             >=190     â¢ HDL 01/14/2019 78  >49 mg/dL Final    Comment: Low            <40  Borderline Low 40 to 49  Near Optimal   50 to 59  Optimal        >=60     â¢ TRIGLYCERIDE 01/14/2019 89  <150 mg/dL Final    Comment: Normal                   <150  Borderline High          150 to 199  High                     200 to 499  Very High                >=500     â¢ CALCULATED NON HDL 01/14/2019 121  mg/dL Final    Comment:    Therapeutic Target:  CHD and risk equivalents <130  Multiple risk factors    <160  0 to 1 risk factors      <190        â¢ CHOL/HDL 01/14/2019 2.6  <4.5 Final   â¢ Fasting Status 01/14/2019 UNK  hrs Final   â¢ Sodium 01/14/2019 143  135 - 145 mmol/L Final   â¢ Potassium 01/14/2019 3.8  3.4 - 5.1 mmol/L Final   â¢ Chloride 01/14/2019 108* 98 - 107 mmol/L Final   â¢ Carbon Dioxide 01/14/2019 27  21 - 32 mmol/L Final   â¢ Anion Gap 01/14/2019 12  10 - 20 mmol/L Final   â¢ Glucose 01/14/2019 95  65 - 99 mg/dL Final   â¢ BUN 01/14/2019 18  6 - 20 mg/dL Final   â¢ Creatinine 01/14/2019 0.79  0.51 - 0.95 mg/dL Final   â¢ GFR Estimate,  01/14/2019 79   Final    eGFR 60 - 89 mL/min/1.73m2 = Mild decrease in kidney function. â¢ GFR Estimate, Non  01/14/2019 68   Final    eGFR 60 - 89 mL/min/1.73m2 = Mild decrease in kidney function. â¢ BUN/Creatinine Ratio 01/14/2019 23  7 - 25 Final   â¢ CALCIUM 01/14/2019 9.6  8.4 - 10.2 mg/dL Final   â¢ TOTAL BILIRUBIN 01/14/2019 0.8  0.2 - 1.0 mg/dL Final   â¢ AST/SGOT 01/14/2019 13  <38 Units/L Final   â¢ ALT/SGPT 01/14/2019 13  <79 Units/L Final   â¢ ALK PHOSPHATASE 01/14/2019 58  45 - 117 Units/L Final   â¢ TOTAL PROTEIN 01/14/2019 7.8  6.4 - 8.2 g/dL Final   â¢ Albumin 01/14/2019 3.7  3.6 - 5.1 g/dL Final   â¢ GLOBULIN 01/14/2019 4.1* 2.0 - 4.0 g/dL Final   â¢ A/G Ratio, Serum 01/14/2019 0.9* 1.0 - 2.4 Final     Assessment         Advanced OA in knees  steroid injection did not help anymore   refused TKA and ortho f/u  does not want a different pain med. doing exercise in light [place]  fall precaution advised  ambulates with cane  Recommend take vitamin D and calcium       Recommend tylenol 500 MG TID        If tylenol does not help in month, call PCP and may proceed Cortizone injection. Glaucoma/Legally blind  takes multiple eye drops and Acetazolamide  had multiple surgeries  she is participating in exercise program organized by [employer]  sees ophthalmologist  Residue vision on left eye and legally blind on right eye    Hx of left Breast cancer , Bill Mastodynia: had lumpectomy and RT  Need to follow up with Oncologist  Last mammogram  Is done on 7/16/18. Hyperlipidemia  takes Omega 3  on low chol. diet  exercising regularly  BS is 90 from recent lab   Â   GERD  mild, intermittent   cont.  GERD diet  take otc antacids PRN    Urge incontinence/Overactive bladder  Improved since last visit  scheduled voiding      Had colonoscopy in Dec. 2016: tubular adenoma removed  recommended 3 y f/u  increase fibers  Â   Obesity  Exercise regularly   advised re low CH diet  needs to lose 10 % of BW in one year 96

## 2021-03-19 ENCOUNTER — TRANSCRIPTION ENCOUNTER (OUTPATIENT)
Age: 41
End: 2021-03-19

## 2021-03-19 ENCOUNTER — NON-APPOINTMENT (OUTPATIENT)
Age: 41
End: 2021-03-19

## 2021-03-19 VITALS
DIASTOLIC BLOOD PRESSURE: 67 MMHG | HEART RATE: 71 BPM | OXYGEN SATURATION: 99 % | SYSTOLIC BLOOD PRESSURE: 105 MMHG | RESPIRATION RATE: 18 BRPM | TEMPERATURE: 98 F

## 2021-03-19 LAB
CULTURE RESULTS: SIGNIFICANT CHANGE UP
SPECIMEN SOURCE: SIGNIFICANT CHANGE UP

## 2021-03-19 PROCEDURE — G0463: CPT

## 2021-03-19 PROCEDURE — 86769 SARS-COV-2 COVID-19 ANTIBODY: CPT

## 2021-03-19 PROCEDURE — 80053 COMPREHEN METABOLIC PANEL: CPT

## 2021-03-19 PROCEDURE — 87070 CULTURE OTHR SPECIMN AEROBIC: CPT

## 2021-03-19 PROCEDURE — 87635 SARS-COV-2 COVID-19 AMP PRB: CPT

## 2021-03-19 PROCEDURE — 88264 CHROMOSOME ANALYSIS 20-25: CPT

## 2021-03-19 PROCEDURE — 86901 BLOOD TYPING SEROLOGIC RH(D): CPT

## 2021-03-19 PROCEDURE — 88305 TISSUE EXAM BY PATHOLOGIST: CPT

## 2021-03-19 PROCEDURE — 86900 BLOOD TYPING SEROLOGIC ABO: CPT

## 2021-03-19 PROCEDURE — 88233 TISSUE CULTURE SKIN/BIOPSY: CPT

## 2021-03-19 PROCEDURE — 85025 COMPLETE CBC W/AUTO DIFF WBC: CPT

## 2021-03-19 PROCEDURE — 86850 RBC ANTIBODY SCREEN: CPT

## 2021-03-19 PROCEDURE — 88280 CHROMOSOME KARYOTYPE STUDY: CPT

## 2021-03-19 PROCEDURE — 85027 COMPLETE CBC AUTOMATED: CPT

## 2021-03-19 PROCEDURE — 81229 CYTOG ALYS CHRML ABNR SNPCGH: CPT

## 2021-03-19 PROCEDURE — 86780 TREPONEMA PALLIDUM: CPT

## 2021-03-19 RX ORDER — ACETAMINOPHEN 500 MG
975 TABLET ORAL ONCE
Refills: 0 | Status: COMPLETED | OUTPATIENT
Start: 2021-03-19 | End: 2021-03-19

## 2021-03-19 RX ADMIN — Medication 975 MILLIGRAM(S): at 12:32

## 2021-03-19 RX ADMIN — Medication 975 MILLIGRAM(S): at 10:59

## 2021-03-19 NOTE — DISCHARGE NOTE NURSING/CASE MANAGEMENT/SOCIAL WORK - PATIENT PORTAL LINK FT
You can access the FollowMyHealth Patient Portal offered by [facility] by registering at the following website: [email or web address]. By joining [employer]’s FollowMyHealth portal, you will also be able to view your health information using other applications (apps) compatible with our system.

## 2021-03-19 NOTE — PROGRESS NOTE ADULT - SUBJECTIVE AND OBJECTIVE BOX
R3 Antepartum Note, HD#1    Interval events: none.    Patient seen and examined at bedside, no acute overnight events. No acute complaints. Pt denies LOF, VB, ctx, HA, epigastric pain, blurred vision, CP, SOB, N/V, fevers, and chills.    Vital Signs Last 24 Hours  T(C): 36.7 (03-18-21 @ 05:26), Max: 37.5 (03-17-21 @ 12:29)  HR: 79 (03-18-21 @ 05:26) (79 - 139)  BP: 116/76 (03-18-21 @ 05:26) (103/55 - 126/66)  RR: 18 (03-18-21 @ 05:26) (18 - 18)  SpO2: 99% (03-18-21 @ 05:26) (90% - 100%)    CAPILLARY BLOOD GLUCOSE      Physical Exam:  General: NAD  Abdomen: Soft, non-tender, gravid  Ext: No pain or swelling    Labs:             12.3   10.03 )-----------( 201      ( 03-17 @ 12:47 )             38.9     03-17 @ 12:47    135  |  102  |  8   ----------------------------<  85  3.8   |  18  |  0.48    Ca    10.2      03-17 @ 12:47    TPro  8.0  /  Alb  4.3  /  TBili  0.7  /  DBili  x   /  AST  20  /  ALT  13  /  AlkPhos  54  03-17 @ 12:47            MEDICATIONS  (STANDING):  lactated ringers. 1000 milliLiter(s) (125 mL/Hr) IV Continuous <Continuous>    MEDICATIONS  (PRN):  
R1 BREN Progress Note  POD#1   HD#2    Patient seen and examined at bedside.  No acute events overnight. No acute complaints.  Pain well controlled. Patient is ambulating and tolerating regular diet. Pt is passing flatus.  Patient is voiding spontaneously. Denies CP, SOB, N/V, fevers, and chills.    Vital Signs Last 24 Hours  T(C): 36.9 (03-19-21 @ 05:15), Max: 37 (03-18-21 @ 17:00)  HR: 75 (03-19-21 @ 05:15) (57 - 108)  BP: 99/65 (03-19-21 @ 05:15) (97/59 - 119/73)  RR: 18 (03-19-21 @ 05:15) (14 - 18)  SpO2: 98% (03-19-21 @ 05:15) (96% - 100%)    I&O's Summary    17 Mar 2021 07:01  -  18 Mar 2021 07:00  --------------------------------------------------------  IN: 125 mL / OUT: 300 mL / NET: -175 mL    18 Mar 2021 07:01  -  19 Mar 2021 06:38  --------------------------------------------------------  IN: 1125 mL / OUT: 1000 mL / NET: 125 mL        Physical Exam:  General: NAD  CV: RR, S1, S2, no M/R/G  Lungs: CTA b/l, good air flow b/l   Abdomen: Soft, appropriately tender, normoactive bowel sounds  : minimal bleeding on pad  Ext: No pain or swelling     Labs:                        9.8    7.78  )-----------( 185      ( 18 Mar 2021 06:41 )             30.4   baso x      eos x      imm gran x      lymph x      mono x      poly x                            12.3   10.03 )-----------( 201      ( 17 Mar 2021 12:47 )             38.9   baso 0.0    eos 0.0    imm gran x      lymph 3.5    mono 5.3    poly 91.2       MEDICATIONS  (STANDING):  diphenoxylate/atropine 2 Tablet(s) Oral once  lactated ringers. 1000 milliLiter(s) (125 mL/Hr) IV Continuous <Continuous>    MEDICATIONS  (PRN):

## 2021-03-19 NOTE — DISCHARGE NOTE NURSING/CASE MANAGEMENT/SOCIAL WORK - NSDCPNINST_GEN_ALL_CORE
See doctor for post care in two weeks. Nothing per vagina: no douching, tampons, and intercourse. Call sooner if having: fever, increase vaginal bleeding, vaginal odor, or any questions or concerns.   Take pain medications when need. Continue hand washing, wear face mask and social distance.

## 2021-03-19 NOTE — PROGRESS NOTE ADULT - ATTENDING COMMENTS
I personally saw and evaluated pt and agree with Dr Brownlee' assessment and plan.  PPROM  at 16wks now stable.  Dr Heredia's note appreciated pt is scheduled for D+E later this afternoon.
Patient s/p d and e. Doing well physically. Feeling sad but appropriate  She will follow up with Dr. Heredia via telemed as directed   She asks for note for work which will be sent from office.  Office follow up with office.

## 2021-03-19 NOTE — PROGRESS NOTE ADULT - PROBLEM SELECTOR PLAN 1
Neuro: PO pain meds   CV: Hemodynamically stable  Pulm: Saturating well on room air, encourage oob/amb  GI:  Continue regular diet  : voiding spontaneously   Heme: c/w HSQ and SCDs for DVT ppx  ID: Afebrile  Endo: No active issues   Dispo: discharge planning    Kimberly Beckham PGY1  seen with GYN team

## 2021-03-22 ENCOUNTER — NON-APPOINTMENT (OUTPATIENT)
Age: 41
End: 2021-03-22

## 2021-04-06 LAB — CHROM ANALY OVERALL INTERP SPEC-IMP: SIGNIFICANT CHANGE UP

## 2021-04-07 ENCOUNTER — APPOINTMENT (OUTPATIENT)
Dept: OBGYN | Facility: CLINIC | Age: 41
End: 2021-04-07
Payer: COMMERCIAL

## 2021-04-07 DIAGNOSIS — D25.9 LEIOMYOMA OF UTERUS, UNSPECIFIED: ICD-10-CM

## 2021-04-07 DIAGNOSIS — Z87.59 PERSONAL HISTORY OF OTHER COMPLICATIONS OF PREGNANCY, CHILDBIRTH AND THE PUERPERIUM: ICD-10-CM

## 2021-04-07 PROCEDURE — 99212 OFFICE O/P EST SF 10 MIN: CPT | Mod: 95

## 2021-05-01 LAB — SURGICAL PATHOLOGY STUDY: SIGNIFICANT CHANGE UP

## 2021-05-17 ENCOUNTER — NON-APPOINTMENT (OUTPATIENT)
Age: 41
End: 2021-05-17

## 2021-05-26 ENCOUNTER — NON-APPOINTMENT (OUTPATIENT)
Age: 41
End: 2021-05-26

## 2021-05-27 ENCOUNTER — ASOB RESULT (OUTPATIENT)
Age: 41
End: 2021-05-27

## 2021-05-27 ENCOUNTER — APPOINTMENT (OUTPATIENT)
Dept: ANTEPARTUM | Facility: CLINIC | Age: 41
End: 2021-05-27
Payer: COMMERCIAL

## 2021-05-27 VITALS
BODY MASS INDEX: 24.66 KG/M2 | HEIGHT: 62 IN | WEIGHT: 134 LBS | DIASTOLIC BLOOD PRESSURE: 69 MMHG | SYSTOLIC BLOOD PRESSURE: 104 MMHG | HEART RATE: 66 BPM

## 2021-05-27 PROCEDURE — 99072 ADDL SUPL MATRL&STAF TM PHE: CPT

## 2021-05-27 PROCEDURE — 99214 OFFICE O/P EST MOD 30 MIN: CPT

## 2022-04-03 ENCOUNTER — EMERGENCY (EMERGENCY)
Facility: HOSPITAL | Age: 42
LOS: 1 days | Discharge: HOME | End: 2022-04-03
Attending: EMERGENCY MEDICINE | Admitting: EMERGENCY MEDICINE
Payer: COMMERCIAL

## 2022-04-03 VITALS
OXYGEN SATURATION: 100 % | SYSTOLIC BLOOD PRESSURE: 108 MMHG | TEMPERATURE: 98 F | RESPIRATION RATE: 21 BRPM | DIASTOLIC BLOOD PRESSURE: 69 MMHG | HEART RATE: 87 BPM

## 2022-04-03 VITALS
OXYGEN SATURATION: 100 % | HEIGHT: 62 IN | DIASTOLIC BLOOD PRESSURE: 75 MMHG | RESPIRATION RATE: 16 BRPM | TEMPERATURE: 98 F | HEART RATE: 73 BPM | SYSTOLIC BLOOD PRESSURE: 134 MMHG

## 2022-04-03 DIAGNOSIS — Z98.891 HISTORY OF UTERINE SCAR FROM PREVIOUS SURGERY: Chronic | ICD-10-CM

## 2022-04-03 LAB
ALBUMIN SERPL ELPH-MCNC: 4.3 G/DL — SIGNIFICANT CHANGE UP (ref 3.3–5)
ALP SERPL-CCNC: 49 U/L — SIGNIFICANT CHANGE UP (ref 40–120)
ALT FLD-CCNC: 11 U/L — SIGNIFICANT CHANGE UP (ref 4–33)
ANION GAP SERPL CALC-SCNC: 16 MMOL/L — HIGH (ref 7–14)
AST SERPL-CCNC: 18 U/L — SIGNIFICANT CHANGE UP (ref 4–32)
BASOPHILS # BLD AUTO: 0.06 K/UL — SIGNIFICANT CHANGE UP (ref 0–0.2)
BASOPHILS NFR BLD AUTO: 0.9 % — SIGNIFICANT CHANGE UP (ref 0–2)
BILIRUB SERPL-MCNC: 0.6 MG/DL — SIGNIFICANT CHANGE UP (ref 0.2–1.2)
BUN SERPL-MCNC: 14 MG/DL — SIGNIFICANT CHANGE UP (ref 7–23)
CALCIUM SERPL-MCNC: 8.9 MG/DL — SIGNIFICANT CHANGE UP (ref 8.4–10.5)
CHLORIDE SERPL-SCNC: 103 MMOL/L — SIGNIFICANT CHANGE UP (ref 98–107)
CO2 SERPL-SCNC: 19 MMOL/L — LOW (ref 22–31)
CREAT SERPL-MCNC: 0.82 MG/DL — SIGNIFICANT CHANGE UP (ref 0.5–1.3)
EGFR: 93 ML/MIN/1.73M2 — SIGNIFICANT CHANGE UP
EOSINOPHIL # BLD AUTO: 0 K/UL — SIGNIFICANT CHANGE UP (ref 0–0.5)
EOSINOPHIL NFR BLD AUTO: 0 % — SIGNIFICANT CHANGE UP (ref 0–6)
GLUCOSE SERPL-MCNC: 86 MG/DL — SIGNIFICANT CHANGE UP (ref 70–99)
HCT VFR BLD CALC: 38 % — SIGNIFICANT CHANGE UP (ref 34.5–45)
HGB BLD-MCNC: 11.9 G/DL — SIGNIFICANT CHANGE UP (ref 11.5–15.5)
IANC: 5.06 K/UL — SIGNIFICANT CHANGE UP (ref 1.8–7.4)
LYMPHOCYTES # BLD AUTO: 0.81 K/UL — LOW (ref 1–3.3)
LYMPHOCYTES # BLD AUTO: 12.7 % — LOW (ref 13–44)
MCHC RBC-ENTMCNC: 22.5 PG — LOW (ref 27–34)
MCHC RBC-ENTMCNC: 31.3 GM/DL — LOW (ref 32–36)
MCV RBC AUTO: 71.8 FL — LOW (ref 80–100)
MONOCYTES # BLD AUTO: 0.41 K/UL — SIGNIFICANT CHANGE UP (ref 0–0.9)
MONOCYTES NFR BLD AUTO: 6.4 % — SIGNIFICANT CHANGE UP (ref 2–14)
NEUTROPHILS # BLD AUTO: 5.01 K/UL — SIGNIFICANT CHANGE UP (ref 1.8–7.4)
NEUTROPHILS NFR BLD AUTO: 78.2 % — HIGH (ref 43–77)
PLATELET # BLD AUTO: 238 K/UL — SIGNIFICANT CHANGE UP (ref 150–400)
POTASSIUM SERPL-MCNC: 4.2 MMOL/L — SIGNIFICANT CHANGE UP (ref 3.5–5.3)
POTASSIUM SERPL-SCNC: 4.2 MMOL/L — SIGNIFICANT CHANGE UP (ref 3.5–5.3)
PROT SERPL-MCNC: 7.3 G/DL — SIGNIFICANT CHANGE UP (ref 6–8.3)
RBC # BLD: 5.29 M/UL — HIGH (ref 3.8–5.2)
RBC # FLD: 14.6 % — HIGH (ref 10.3–14.5)
SODIUM SERPL-SCNC: 138 MMOL/L — SIGNIFICANT CHANGE UP (ref 135–145)
TROPONIN T, HIGH SENSITIVITY RESULT: <6 NG/L — SIGNIFICANT CHANGE UP
WBC # BLD: 6.41 K/UL — SIGNIFICANT CHANGE UP (ref 3.8–10.5)
WBC # FLD AUTO: 6.41 K/UL — SIGNIFICANT CHANGE UP (ref 3.8–10.5)

## 2022-04-03 PROCEDURE — 71046 X-RAY EXAM CHEST 2 VIEWS: CPT | Mod: 26

## 2022-04-03 PROCEDURE — 99285 EMERGENCY DEPT VISIT HI MDM: CPT

## 2022-04-03 RX ORDER — SODIUM CHLORIDE 9 MG/ML
1000 INJECTION INTRAMUSCULAR; INTRAVENOUS; SUBCUTANEOUS ONCE
Refills: 0 | Status: COMPLETED | OUTPATIENT
Start: 2022-04-03 | End: 2022-04-03

## 2022-04-03 RX ADMIN — SODIUM CHLORIDE 1000 MILLILITER(S): 9 INJECTION INTRAMUSCULAR; INTRAVENOUS; SUBCUTANEOUS at 13:09

## 2022-04-03 NOTE — ED PROVIDER NOTE - OBJECTIVE STATEMENT
40 y/o F presents to the ED w/ near syncopal episode. States she was in [employer] this morning and was hot wearing a coat. Initially she was standing and started feeling lightheaded and clammy and went down to her knees as part of the ceremony and shortly after started to lean over and almost passed out. Pt was caught by several bystanders who elevated her legs and her symptoms resolved. Denies fever, cough, chest pain, SOB, abdominal pain, vomiting, diarrhea, or edema. No recent travel or no sick contacts. Currently states she feels back to baseline. PMHx elevated prolactin levels (on bromocryptine), soon to start IVF process. PSHx .

## 2022-04-03 NOTE — ED PROVIDER NOTE - PHYSICAL EXAMINATION
GEN - NAD; well appearing; A+O x3   HEAD - NC/AT   EYES- PERRL, EOMI  ENT: Airway patent, mmm, Oral cavity and pharynx normal. No inflammation, swelling, exudate, or lesions.  NECK: Neck supple, non-tender without lymphadenopathy, no masses.  PULMONARY - CTA b/l, symmetric breath sounds.   CARDIAC -s1s2, RRR, no M,G,R  ABDOMEN - +BS, ND, NT, soft, no guarding, no rebound, no masses   BACK - no CVA tenderness, Normal  spine   EXTREMITIES - FROM, symmetric pulses, capillary refill < 2 seconds, no edema   SKIN - no rash or bruising   NEUROLOGIC - alert, speech clear, no focal deficits  PSYCH -nl mood/affect, nl insight. GEN - NAD; well appearing; A+O x3   HEAD - NC/AT   EYES- PERRL, EOMI  ENT: Airway patent, mmm, Oral cavity and pharynx normal. No inflammation, swelling, exudate, or lesions.    NECK: Neck supple  PULMONARY - CTA b/l, symmetric breath sounds.   CARDIAC -s1s2, RRR, no M,G,R  ABDOMEN - +BS, ND, NT, soft, no guarding, no rebound, no masses   BACK - no CVA tenderness, Normal  spine   EXTREMITIES - FROM, symmetric pulses, capillary refill < 2 seconds, no edema   SKIN - no rash or bruising   NEUROLOGIC - alert, speech clear, no focal deficits  PSYCH -nl mood/affect, nl insight.

## 2022-04-03 NOTE — ED PROVIDER NOTE - NSICDXPASTMEDICALHX_GEN_ALL_CORE_FT
PAST MEDICAL HISTORY:  Fibroid, uterine     Iron deficiency anemia     Vitamin D deficiency       Elevated prolactin level

## 2022-04-03 NOTE — ED PROVIDER NOTE - PATIENT PORTAL LINK FT
You can access the FollowMyHealth Patient Portal offered by [facility] by registering at the following website: [email or web address]. By joining [employer]’s FollowMyHealth portal, you will also be able to view your health information using other applications (apps) compatible with our system.

## 2022-04-03 NOTE — ED ADULT TRIAGE NOTE - CHIEF COMPLAINT QUOTE
states  this am at [employer] ,felt warm- does not recall incident- states awoke with people helping who reported no head injury. denies pain. ems  fs 106.  lmp 3/24. states  this am at [employer] ,felt warm- does not recall incident- states awoke with people helping who reported no head injury. denies pain. ems  fs 106.  lmp 3/24.  fs 87 in ed

## 2022-04-03 NOTE — ED PROVIDER NOTE - CLINICAL SUMMARY MEDICAL DECISION MAKING FREE TEXT BOX
42 y/o F presents to the ED w/ near syncopal episode which occurred in setting of feeling over heated. Pt now back to baseline, no other associated symptoms. Appears well, vital signs stable, exam benign. Plan to obtain labs, EKG, chest x-ray, tele monitoring, and hydration. Suspect vasovagal episode. Will evaluate for anemia, electrolyte disturbance, organ disfunction, monitor for arrhythmia, and reassess.

## 2022-04-03 NOTE — ED ADULT NURSE NOTE - CHIEF COMPLAINT QUOTE
states  this am at [employer] ,felt warm- does not recall incident- states awoke with people helping who reported no head injury. denies pain. ems  fs 106.  lmp 3/24.  fs 87 in ed

## 2022-04-03 NOTE — ED PROVIDER NOTE - PROGRESS NOTE DETAILS
All results d/w patient and copies given with instructions to bring with them to their follow up appointment.  The patient was given verbal and written discharge instructions Specifically, instructions when to return to the ED and to seek follow-up from their pcp (who she states is also a cardiologist) within 1-2 days. The patient understands that should their symptoms worsen or any new symptoms arise, they should return to the ED immediately for further evaluation.  Vss, NAD, tolerating po and ambulating steadily at discharge.

## 2022-04-03 NOTE — ED PROVIDER NOTE - NSFOLLOWUPINSTRUCTIONS_ED_ALL_ED_FT
You were seen in the Emergency Department for passing out.  YOu had blood tests, an ekg, and a chest x-ray performed.  Your results are included in this packet.    Syncope    Syncope is when you faint/almost faint or passing out. It is caused by a sudden decrease in blood flow to the brain. Even though most causes of syncope are not dangerous, syncope can possibly be a sign of a serious medical problem. Signs that you may be about to faint include feeling dizzy, lightheaded, nausea, visual changes, or cold/clammy skin. Do not drive, operate heavy machinery, or play sports until your health care provider says it is okay.    SEEK IMMEDIATE MEDICAL CARE IF YOU HAVE ANY OF THE FOLLOWING SYMPTOMS: severe headache, pain in your chest/abdomen/back, bleeding from your mouth or rectum, palpitations, shortness of breath, pain with breathing, seizure, confusion, or trouble walking.    Please make sure to stay well hydrated. Please follow up with your cardiologist within the next 48 hours.

## 2022-04-03 NOTE — ED ADULT NURSE NOTE - OBJECTIVE STATEMENT
Patient is a 40 yo female, denies PMH, presenting with near-syncopal episode Patient is a 42 yo female, denies PMH, presenting with near-syncopal episode at [employer] this morning. Patient reports she was standing and felt hot and clammy, then was kneeling as part of the service and fell over to one side. Reports she was caught by bystanders, endorses LOC but denies headstrike. Patient now reports she feels back to baseline, denies chest pain, shortness of breath, dizziness, headache, blurred vision, numbness/tingling, weakness. Reports she is on some new medications prior to an IVF cycle. 20G PIV placed to [facility], labs sent per orders. Pending x-ray. Fall precautions maintained.

## 2022-09-09 ENCOUNTER — EMERGENCY (EMERGENCY)
Facility: HOSPITAL | Age: 42
LOS: 1 days | Discharge: HOME | End: 2022-09-09
Attending: STUDENT IN AN ORGANIZED HEALTH CARE EDUCATION/TRAINING PROGRAM | Admitting: STUDENT IN AN ORGANIZED HEALTH CARE EDUCATION/TRAINING PROGRAM

## 2022-09-09 VITALS
HEART RATE: 71 BPM | SYSTOLIC BLOOD PRESSURE: 97 MMHG | RESPIRATION RATE: 18 BRPM | DIASTOLIC BLOOD PRESSURE: 66 MMHG | OXYGEN SATURATION: 100 %

## 2022-09-09 VITALS
OXYGEN SATURATION: 100 % | HEIGHT: 62 IN | DIASTOLIC BLOOD PRESSURE: 109 MMHG | SYSTOLIC BLOOD PRESSURE: 134 MMHG | TEMPERATURE: 98 F | RESPIRATION RATE: 18 BRPM | HEART RATE: 83 BPM

## 2022-09-09 DIAGNOSIS — Z98.891 HISTORY OF UTERINE SCAR FROM PREVIOUS SURGERY: Chronic | ICD-10-CM

## 2022-09-09 LAB
ALBUMIN SERPL ELPH-MCNC: 4.6 G/DL — SIGNIFICANT CHANGE UP (ref 3.3–5)
ALP SERPL-CCNC: 56 U/L — SIGNIFICANT CHANGE UP (ref 40–120)
ALT FLD-CCNC: 29 U/L — SIGNIFICANT CHANGE UP (ref 4–33)
ANION GAP SERPL CALC-SCNC: 11 MMOL/L — SIGNIFICANT CHANGE UP (ref 7–14)
APPEARANCE UR: ABNORMAL
AST SERPL-CCNC: 23 U/L — SIGNIFICANT CHANGE UP (ref 4–32)
BACTERIA # UR AUTO: NEGATIVE — SIGNIFICANT CHANGE UP
BASOPHILS # BLD AUTO: 0.06 K/UL — SIGNIFICANT CHANGE UP (ref 0–0.2)
BASOPHILS NFR BLD AUTO: 1 % — SIGNIFICANT CHANGE UP (ref 0–2)
BILIRUB SERPL-MCNC: 1.8 MG/DL — HIGH (ref 0.2–1.2)
BILIRUB UR-MCNC: NEGATIVE — SIGNIFICANT CHANGE UP
BUN SERPL-MCNC: 13 MG/DL — SIGNIFICANT CHANGE UP (ref 7–23)
CALCIUM SERPL-MCNC: 9.8 MG/DL — SIGNIFICANT CHANGE UP (ref 8.4–10.5)
CHLORIDE SERPL-SCNC: 102 MMOL/L — SIGNIFICANT CHANGE UP (ref 98–107)
CO2 SERPL-SCNC: 24 MMOL/L — SIGNIFICANT CHANGE UP (ref 22–31)
COLOR SPEC: ABNORMAL
CREAT SERPL-MCNC: 0.66 MG/DL — SIGNIFICANT CHANGE UP (ref 0.5–1.3)
DIFF PNL FLD: NEGATIVE — SIGNIFICANT CHANGE UP
EGFR: 113 ML/MIN/1.73M2 — SIGNIFICANT CHANGE UP
EOSINOPHIL # BLD AUTO: 0.2 K/UL — SIGNIFICANT CHANGE UP (ref 0–0.5)
EOSINOPHIL NFR BLD AUTO: 3.3 % — SIGNIFICANT CHANGE UP (ref 0–6)
EPI CELLS # UR: 6 /HPF — HIGH (ref 0–5)
FLUAV AG NPH QL: SIGNIFICANT CHANGE UP
FLUBV AG NPH QL: SIGNIFICANT CHANGE UP
GLUCOSE SERPL-MCNC: 97 MG/DL — SIGNIFICANT CHANGE UP (ref 70–99)
GLUCOSE UR QL: NEGATIVE — SIGNIFICANT CHANGE UP
HCT VFR BLD CALC: 41.2 % — SIGNIFICANT CHANGE UP (ref 34.5–45)
HGB BLD-MCNC: 13.1 G/DL — SIGNIFICANT CHANGE UP (ref 11.5–15.5)
HYALINE CASTS # UR AUTO: 8 /LPF — HIGH (ref 0–7)
IANC: 3.83 K/UL — SIGNIFICANT CHANGE UP (ref 1.8–7.4)
IMM GRANULOCYTES NFR BLD AUTO: 0.3 % — SIGNIFICANT CHANGE UP (ref 0–1.5)
KETONES UR-MCNC: ABNORMAL
LEUKOCYTE ESTERASE UR-ACNC: NEGATIVE — SIGNIFICANT CHANGE UP
LYMPHOCYTES # BLD AUTO: 1.35 K/UL — SIGNIFICANT CHANGE UP (ref 1–3.3)
LYMPHOCYTES # BLD AUTO: 22.4 % — SIGNIFICANT CHANGE UP (ref 13–44)
MAGNESIUM SERPL-MCNC: 1.8 MG/DL — SIGNIFICANT CHANGE UP (ref 1.6–2.6)
MCHC RBC-ENTMCNC: 22.6 PG — LOW (ref 27–34)
MCHC RBC-ENTMCNC: 31.8 GM/DL — LOW (ref 32–36)
MCV RBC AUTO: 71.2 FL — LOW (ref 80–100)
MONOCYTES # BLD AUTO: 0.57 K/UL — SIGNIFICANT CHANGE UP (ref 0–0.9)
MONOCYTES NFR BLD AUTO: 9.5 % — SIGNIFICANT CHANGE UP (ref 2–14)
NEUTROPHILS # BLD AUTO: 3.83 K/UL — SIGNIFICANT CHANGE UP (ref 1.8–7.4)
NEUTROPHILS NFR BLD AUTO: 63.5 % — SIGNIFICANT CHANGE UP (ref 43–77)
NITRITE UR-MCNC: NEGATIVE — SIGNIFICANT CHANGE UP
NRBC # BLD: 0 /100 WBCS — SIGNIFICANT CHANGE UP (ref 0–0)
NRBC # FLD: 0 K/UL — SIGNIFICANT CHANGE UP (ref 0–0)
PH UR: 6 — SIGNIFICANT CHANGE UP (ref 5–8)
PHOSPHATE SERPL-MCNC: 3.5 MG/DL — SIGNIFICANT CHANGE UP (ref 2.5–4.5)
PLATELET # BLD AUTO: 219 K/UL — SIGNIFICANT CHANGE UP (ref 150–400)
POTASSIUM SERPL-MCNC: 4.1 MMOL/L — SIGNIFICANT CHANGE UP (ref 3.5–5.3)
POTASSIUM SERPL-SCNC: 4.1 MMOL/L — SIGNIFICANT CHANGE UP (ref 3.5–5.3)
PROT SERPL-MCNC: 7.5 G/DL — SIGNIFICANT CHANGE UP (ref 6–8.3)
PROT UR-MCNC: ABNORMAL
RBC # BLD: 5.79 M/UL — HIGH (ref 3.8–5.2)
RBC # FLD: 14.3 % — SIGNIFICANT CHANGE UP (ref 10.3–14.5)
RBC CASTS # UR COMP ASSIST: 14 /HPF — HIGH (ref 0–4)
RSV RNA NPH QL NAA+NON-PROBE: SIGNIFICANT CHANGE UP
SARS-COV-2 RNA SPEC QL NAA+PROBE: SIGNIFICANT CHANGE UP
SODIUM SERPL-SCNC: 137 MMOL/L — SIGNIFICANT CHANGE UP (ref 135–145)
SP GR SPEC: 1.04 — SIGNIFICANT CHANGE UP (ref 1.01–1.05)
UROBILINOGEN FLD QL: ABNORMAL
WBC # BLD: 6.03 K/UL — SIGNIFICANT CHANGE UP (ref 3.8–10.5)
WBC # FLD AUTO: 6.03 K/UL — SIGNIFICANT CHANGE UP (ref 3.8–10.5)
WBC UR QL: 5 /HPF — SIGNIFICANT CHANGE UP (ref 0–5)

## 2022-09-09 PROCEDURE — 99284 EMERGENCY DEPT VISIT MOD MDM: CPT

## 2022-09-09 RX ORDER — SODIUM CHLORIDE 9 MG/ML
1000 INJECTION INTRAMUSCULAR; INTRAVENOUS; SUBCUTANEOUS ONCE
Refills: 0 | Status: COMPLETED | OUTPATIENT
Start: 2022-09-09 | End: 2022-09-09

## 2022-09-09 RX ORDER — METOCLOPRAMIDE HCL 10 MG
10 TABLET ORAL ONCE
Refills: 0 | Status: COMPLETED | OUTPATIENT
Start: 2022-09-09 | End: 2022-09-09

## 2022-09-09 RX ORDER — ONDANSETRON 8 MG/1
1 TABLET, FILM COATED ORAL
Qty: 9 | Refills: 0
Start: 2022-09-09 | End: 2022-09-11

## 2022-09-09 RX ADMIN — SODIUM CHLORIDE 1000 MILLILITER(S): 9 INJECTION INTRAMUSCULAR; INTRAVENOUS; SUBCUTANEOUS at 15:41

## 2022-09-09 RX ADMIN — Medication 10 MILLIGRAM(S): at 15:53

## 2022-09-09 NOTE — ED PROVIDER NOTE - NSICDXPASTMEDICALHX_GEN_ALL_CORE_FT
PAST MEDICAL HISTORY:  Elevated prolactin level     Fibroid, uterine     Iron deficiency anemia     Vitamin D deficiency

## 2022-09-09 NOTE — ED PROVIDER NOTE - ENMT, MLM
"-- DO NOT REPLY / DO NOT REPLY ALL --  -- Message is from the [employer]--    Result Request  Type of Test / Lab: Prostate exam    Date Test / Lab: UNKNOWN   Location: UNKNOWN   Test / Lab ordered/authorized by: Mason Palomares     Other comments: Patient would like to know if he can have medication for his prostate. He is leaving to go out of town next week. Please give a call back to assist.     Preferred Delivery Method   Call back patient with results. Phone number:  [phone] Information       Type Contact Phone    12/30/2019 10:53 AM Phone (Incoming) Kwabena Lara (Self) [phone] (H)          Alternative phone number: none     Turnaround time given to caller: ""This message will be sent to [facility] Provider's name]. The clinical team will fulfill your request as soon as they review your message. \""  " Airway patent

## 2022-09-09 NOTE — ED ADULT TRIAGE NOTE - CHIEF COMPLAINT QUOTE
Pt reporting to the ED from OBGYN for vomiting  for ~ 2 weeks. sent into ED for IV fluids. Pt is 9 weeks pregnant,  1 miscarriage in the past. no vaginal bleeding ro abdominal pain.

## 2022-09-09 NOTE — ED PROVIDER NOTE - OBJECTIVE STATEMENT
41 y/o F no PMH  approx 9 weeks GA, IUP confirmed on ELIAS this week c/o vomiting and inability to tolerate PO x 2 weeks. Pt was sent to urgent care today on referral from her OBGYN ([facility]), given 1 dose zofran sublingual and sent to ED. Pt states its been days since she has kept down liquids and 1-2 weeks since she has kept down solids. Denies fever, chills, CP, SOB, abdominal/pelvic pain, vaginal bleeding, abnormal vaginal discharge, fluid leakage. Of note, pt is still on IVF meds for this pregnancy.

## 2022-09-09 NOTE — ED PROVIDER NOTE - PATIENT PORTAL LINK FT
You can access the FollowMyHealth Patient Portal offered by [facility] by registering at the following website: [email or web address]. By joining [employer]’s FollowMyHealth portal, you will also be able to view your health information using other applications (apps) compatible with our system.

## 2022-09-09 NOTE — ED ADULT NURSE NOTE - OBJECTIVE STATEMENT
received pt in intake room 4, 42 yr/o female A+Ox4, ambulatory at baseline. denies significant PMH. pt presented to the ED from PCP for dehydration due to N/V. pt states she is 9 wks pregnant. pt has not vomited while in the ED. denies abdominal pain. VSS, denies chest pain and SOB, RR even and unlabored. right AC 20g placed, labs drawn and sent. medications given as ordered. will continue to monitor.

## 2022-09-09 NOTE — ED PROVIDER NOTE - ATTENDING APP SHARED VISIT CONTRIBUTION OF CARE
I have personally performed a face to face medical and diagnostic evaluation of the patient. I have discussed with and reviewed the RYAN's note and agree with the History, ROS, Physical Exam and MDM unless otherwise indicated. A brief summary of my personal evaluation and impression can be found below.    Skye VARGAS: 42F  approx 9w preg followed by OB w conf IUP presents with a cc of nausea, vomiting x2 weeks reports has been unable to tolerate PO for x2 weeks, no fever, no rash, no lower abdominal pain, vaginal discharge or bleeding, on clarification pt reports is able to tolerate small amounts of liquids but is not able to tolerate solids, was seen at urgent care earlier today, reports was given sub lingual zofran and sent to ED for further eval.     All other ROS negative, except as above and as per HPI and ROS section.    VITALS: Initial triage and subsequent vitals have been reviewed by me.  GEN APPEARANCE: WDWN, alert, non-toxic, NAD  HEAD: Atraumatic.  EYES: PERRLa, EOMI, vision grossly intact.   NECK: Supple  CV: RRR, S1S2, no c/r/m/g. Cap refill <2 seconds. No bruits.   LUNGS: CTAB. No abnormal breath sounds.  ABDOMEN: Soft, NTND. No guarding or rebound.   MSK/EXT: No spinal or extremity point tenderness. No CVA ttp. Pelvis stable. No peripheral edema.  NEURO: Alert, follows commands. Weight bearing normal. Speech normal. Sensation and motor normal x4 extremities.   SKIN: Warm, dry and intact. No rash.  PSYCH: Appropriate        Plan/MDM:  Skye VARGAS: 42F  approx 9w preg followed by OB w conf IUP presents with a cc of nausea, vomiting x2 weeks reports has been unable to tolerate PO for x2 weeks, no fever, no rash, no lower abdominal pain, vaginal discharge or bleeding, on clarification pt reports is able to tolerate small amounts of liquids but is not able to tolerate solids, was seen at urgent care earlier today, reports was given sub lingual zofran and sent to ED for further eval. exam vss non toxic well appearing, ddx c/f likely hyperemesis in setting of pregnancy, no acute ob complaints at this time, will check labs urine give meds, reassess thereafter.

## 2022-09-09 NOTE — ED PROVIDER NOTE - CONSTITUTIONAL, MLM
Well appearing, awake, alert, oriented to person, place, time/situation and in no apparent distress. normal... pulse ox/cardiac monitor/IV

## 2022-09-09 NOTE — ED PROVIDER NOTE - PROGRESS NOTE DETAILS
pt toelrated po well s/p meds and ivh, labs unremarkable, will dc with zofran and abdias f/u - pt amenable to plan and notes symptomatic improvement,

## 2022-09-13 RX ORDER — NITROFURANTOIN MACROCRYSTAL 50 MG
1 CAPSULE ORAL
Qty: 14 | Refills: 0
Start: 2022-09-13 | End: 2022-09-19

## 2022-09-13 NOTE — ED POST DISCHARGE NOTE - ADDITIONAL DOCUMENTATION
Patient 9 week pregnant, rx for macrobid sent to pharmacy.  Told patient to follow up with ob/gyn and repeat urine culture after course completed.

## 2022-09-14 ENCOUNTER — ASOB RESULT (OUTPATIENT)
Age: 42
End: 2022-09-14

## 2022-09-14 ENCOUNTER — APPOINTMENT (OUTPATIENT)
Dept: MATERNAL FETAL MEDICINE | Facility: CLINIC | Age: 42
End: 2022-09-14

## 2022-09-14 ENCOUNTER — APPOINTMENT (OUTPATIENT)
Dept: ANTEPARTUM | Facility: CLINIC | Age: 42
End: 2022-09-14

## 2022-09-14 VITALS
OXYGEN SATURATION: 99 % | HEART RATE: 87 BPM | DIASTOLIC BLOOD PRESSURE: 65 MMHG | WEIGHT: 128.13 LBS | BODY MASS INDEX: 24.19 KG/M2 | HEIGHT: 61 IN | SYSTOLIC BLOOD PRESSURE: 105 MMHG

## 2022-09-14 PROCEDURE — ZZZZZ: CPT

## 2022-09-14 PROCEDURE — 76815 OB US LIMITED FETUS(S): CPT

## 2022-09-23 ENCOUNTER — TRANSCRIPTION ENCOUNTER (OUTPATIENT)
Age: 42
End: 2022-09-23

## 2022-10-04 ENCOUNTER — ASOB RESULT (OUTPATIENT)
Age: 42
End: 2022-10-04

## 2022-10-04 ENCOUNTER — APPOINTMENT (OUTPATIENT)
Dept: ANTEPARTUM | Facility: CLINIC | Age: 42
End: 2022-10-04

## 2022-10-04 PROCEDURE — 76801 OB US < 14 WKS SINGLE FETUS: CPT

## 2022-10-04 PROCEDURE — 76813 OB US NUCHAL MEAS 1 GEST: CPT

## 2022-10-05 ENCOUNTER — OUTPATIENT (OUTPATIENT)
Dept: OUTPATIENT SERVICES | Facility: HOSPITAL | Age: 42
LOS: 1 days | End: 2022-10-05
Payer: COMMERCIAL

## 2022-10-05 DIAGNOSIS — Z98.891 HISTORY OF UTERINE SCAR FROM PREVIOUS SURGERY: Chronic | ICD-10-CM

## 2022-10-05 DIAGNOSIS — Z11.52 ENCOUNTER FOR SCREENING FOR COVID-19: ICD-10-CM

## 2022-10-05 LAB — SARS-COV-2 RNA SPEC QL NAA+PROBE: SIGNIFICANT CHANGE UP

## 2022-10-05 PROCEDURE — U0005: CPT

## 2022-10-05 PROCEDURE — U0003: CPT

## 2022-10-05 PROCEDURE — C9803: CPT

## 2022-10-06 ENCOUNTER — TRANSCRIPTION ENCOUNTER (OUTPATIENT)
Age: 42
End: 2022-10-06

## 2022-10-07 ENCOUNTER — OUTPATIENT (OUTPATIENT)
Dept: OUTPATIENT SERVICES | Facility: HOSPITAL | Age: 42
LOS: 1 days | Discharge: HOME | End: 2022-10-07
Payer: COMMERCIAL

## 2022-10-07 ENCOUNTER — TRANSCRIPTION ENCOUNTER (OUTPATIENT)
Age: 42
End: 2022-10-07

## 2022-10-07 VITALS — HEART RATE: 65 BPM | SYSTOLIC BLOOD PRESSURE: 106 MMHG | DIASTOLIC BLOOD PRESSURE: 53 MMHG

## 2022-10-07 DIAGNOSIS — O34.32 MATERNAL CARE FOR CERVICAL INCOMPETENCE, SECOND TRIMESTER: ICD-10-CM

## 2022-10-07 DIAGNOSIS — O09.211 SUPERVISION OF PREGNANCY WITH HISTORY OF PRE-TERM LABOR, FIRST TRIMESTER: ICD-10-CM

## 2022-10-07 DIAGNOSIS — Z98.891 HISTORY OF UTERINE SCAR FROM PREVIOUS SURGERY: Chronic | ICD-10-CM

## 2022-10-07 LAB
BASOPHILS # BLD AUTO: 0.04 K/UL — SIGNIFICANT CHANGE UP (ref 0–0.2)
BASOPHILS NFR BLD AUTO: 1 % — SIGNIFICANT CHANGE UP (ref 0–2)
BLD GP AB SCN SERPL QL: NEGATIVE — SIGNIFICANT CHANGE UP
EOSINOPHIL # BLD AUTO: 0.44 K/UL — SIGNIFICANT CHANGE UP (ref 0–0.5)
EOSINOPHIL NFR BLD AUTO: 10.5 % — HIGH (ref 0–6)
HCT VFR BLD CALC: 36.7 % — SIGNIFICANT CHANGE UP (ref 34.5–45)
HGB BLD-MCNC: 11.7 G/DL — SIGNIFICANT CHANGE UP (ref 11.5–15.5)
IMM GRANULOCYTES NFR BLD AUTO: 0.2 % — SIGNIFICANT CHANGE UP (ref 0–0.9)
LYMPHOCYTES # BLD AUTO: 1.17 K/UL — SIGNIFICANT CHANGE UP (ref 1–3.3)
LYMPHOCYTES # BLD AUTO: 27.8 % — SIGNIFICANT CHANGE UP (ref 13–44)
MCHC RBC-ENTMCNC: 22.7 PG — LOW (ref 27–34)
MCHC RBC-ENTMCNC: 31.9 GM/DL — LOW (ref 32–36)
MCV RBC AUTO: 71.3 FL — LOW (ref 80–100)
MONOCYTES # BLD AUTO: 0.33 K/UL — SIGNIFICANT CHANGE UP (ref 0–0.9)
MONOCYTES NFR BLD AUTO: 7.8 % — SIGNIFICANT CHANGE UP (ref 2–14)
NEUTROPHILS # BLD AUTO: 2.22 K/UL — SIGNIFICANT CHANGE UP (ref 1.8–7.4)
NEUTROPHILS NFR BLD AUTO: 52.7 % — SIGNIFICANT CHANGE UP (ref 43–77)
NRBC # BLD: 0 /100 WBCS — SIGNIFICANT CHANGE UP (ref 0–0)
PLATELET # BLD AUTO: 165 K/UL — SIGNIFICANT CHANGE UP (ref 150–400)
RBC # BLD: 5.15 M/UL — SIGNIFICANT CHANGE UP (ref 3.8–5.2)
RBC # FLD: 14.1 % — SIGNIFICANT CHANGE UP (ref 10.3–14.5)
RH IG SCN BLD-IMP: POSITIVE — SIGNIFICANT CHANGE UP
WBC # BLD: 4.21 K/UL — SIGNIFICANT CHANGE UP (ref 3.8–10.5)
WBC # FLD AUTO: 4.21 K/UL — SIGNIFICANT CHANGE UP (ref 3.8–10.5)

## 2022-10-07 PROCEDURE — 86900 BLOOD TYPING SEROLOGIC ABO: CPT

## 2022-10-07 PROCEDURE — 59320 REVISION OF CERVIX: CPT

## 2022-10-07 PROCEDURE — 86850 RBC ANTIBODY SCREEN: CPT

## 2022-10-07 PROCEDURE — 85025 COMPLETE CBC W/AUTO DIFF WBC: CPT

## 2022-10-07 PROCEDURE — 86901 BLOOD TYPING SEROLOGIC RH(D): CPT

## 2022-10-07 RX ORDER — SODIUM CHLORIDE 9 MG/ML
1000 INJECTION, SOLUTION INTRAVENOUS
Refills: 0 | Status: DISCONTINUED | OUTPATIENT
Start: 2022-10-07 | End: 2022-10-21

## 2022-10-07 RX ORDER — SODIUM CHLORIDE 9 MG/ML
1000 INJECTION, SOLUTION INTRAVENOUS
Refills: 0 | Status: DISCONTINUED | OUTPATIENT
Start: 2022-10-07 | End: 2022-10-07

## 2022-10-07 RX ADMIN — SODIUM CHLORIDE 125 MILLILITER(S): 9 INJECTION, SOLUTION INTRAVENOUS at 15:30

## 2022-10-07 NOTE — OB PROVIDER TRIAGE NOTE - NSHPPHYSICALEXAM_GEN_ALL_CORE
T(C): 37 (10-07-22 @ 11:21), Max: 37.0 (10-07-22 @ 10:41)  T(F): 98.6 (10-07-22 @ 11:21), Max: 98.6 (10-07-22 @ 10:41)  HR: 62 (10-07-22 @ 11:31) (57 - 84)  BP: 106/53 (10-07-22 @ 11:21) (106/53 - 106/53)  RR: 18 (10-07-22 @ 11:21) (18 - 18)  SpO2: 100% (10-07-22 @ 11:31) (85% - 100%)    PE:  Gen: AOx3  CV: normal S1, S2  Pulm: CTAB  Abd: soft, nontender, gravid  Ext: no edema    Bedside TAUS: SIUP, FHR 141bpm, gross fetal movements

## 2022-10-07 NOTE — OB PROVIDER TRIAGE NOTE - ATTENDING COMMENTS
Patient is s/p cerclage placement. She remained overnight due to urinary retention. This morning lerner removed and she is voiding without complication.  rechecked this AM and normal. Agree with above plan for discharge home.   Carly Hirschberg, MD

## 2022-10-07 NOTE — OB RN PATIENT PROFILE - NS_OBGYNHISTORY_OBGYN_ALL_OB_FT
F3P1 presents for a cerclage     hyperemesis during this preg.     pt to start homecare and IV placement tomorrow at home d/t Hypermesis

## 2022-10-07 NOTE — OB PROVIDER TRIAGE NOTE - HISTORY OF PRESENT ILLNESS
43yo  at 12w6d, RYLAND 4/15/23, dated by day 5 embryo transfer with history of cervical insufficiency. Currently denies abdominal/pelvic pain, vaginal bleeding, or leakage of fluid. Denies complications in this pregnancy.     PNC: No complications, GBS unknown, NIPS low risk  OBHx: G1 () FT LTCS, 10 lbs; G2 () PPROM at 16w, D&E  GYNHx: History of fibroids, s/p hysteroscopic myomectomy x2  PMH: herniated cervical spine   PSH: D&E (3/2021), hysteroscopic myomectomy (, )  Meds: PNV, iron, ASA 162mg  All: NKDA  SHx: denies    43yo  at 13w0d, RYLAND 23, dated by day 5 embryo transfer with history of cervical insufficiency. Currently denies abdominal/pelvic pain, vaginal bleeding, or leakage of fluid. Denies complications in this pregnancy.     PNC: No complications, GBS unknown, NIPS low risk  OBHx: G1 () FT LTCS, 10 lbs; G2 () PPROM at 16w, D&E  GYNHx: History of fibroids, s/p hysteroscopic myomectomy x2  PMH: herniated cervical spine   PSH: D&E (3/2021), hysteroscopic myomectomy (, )  Meds: PNV, iron, ASA 162mg  All: NKDA  SHx: denies

## 2022-10-07 NOTE — OB PROVIDER TRIAGE NOTE - NSOBPROVIDERNOTE_OBGYN_ALL_OB_FT
41yo  at 12w6d, RYLAND 4/15/23, dated by day 5 embryo transfer, presents for scheduled history-indicated cerclage.     - f/u cbc, T&S  - NPO  - IV fluids  - anesthesia consult  - sign informed consent     Discussed with MFM attending,  and MFM Fellow, Dr.Sedaghatfar Lian Dwyer, PGY-3 41yo  at 13w0d, RYLAND 23, dated by day 5 embryo transfer, presents for scheduled history-indicated cerclage.     - f/u cbc, T&S  - NPO  - IV fluids  - anesthesia consult  - sign informed consent     Discussed with MFM attending,  and MFM Fellow, Dr.Sedaghatfar Lian Dwyer, PGY-3 41yo  at 13w0d, RYLAND 23, dated by day 5 embryo transfer, presents for scheduled history-indicated cerclage.     - f/u cbc, T&S  - NPO  - IV fluids  - anesthesia consult  - sign informed consent     Discussed with M attending,  and M Fellow, Dr.Sedaghatfar Lian Dwyer, PGY-3    R3 Addendum (12:40pm)  Patient seen and evaluated at the bedside. Denies any complaints, including contractions, pelvic pressure, LOF or VB. Lerner was removed at 9:30am, and patient ambulated to the restroom, voided 150cc. She felt like she emptied her bladder when voiding, has been tolerating PO hydration and receiving IV fluids, and feels the urge to void again. Denies fevers, chills, n/v/d, constipation, abdominal pain.     TAUS: FHR 143bpm, gross fetal movement noted, bladder not distended     Assessment: 41yo  @13w1d presented for scheduled cervical cerclage. Postoperative course c/b urinary retention requiring straight catheterization, followed by persistent urinary retention requiring insertion of indwelling lerner catheter. Lerner catheter removed this morning, and patient was able to void. Maternal and fetal status reassuring.     Plan  - Patient stable for discharge home  - Patient instructed to f/u with her OBGYN as scheduled for continued observation and prenatal care  - Patient instructed to return to L&D if experiencing severely painful and regular contractions, leakage of fluid, vaginal bleeding, signs/symptoms of fever, or inability to void.   - Patient verbalized understanding. All questions answered thoroughly and to patient's satisfaction    d/w Dr. Hirschberg Nadege Assassi PGY3

## 2022-10-07 NOTE — OB RN TRIAGE NOTE - NS_PARA_OBGYN_ALL_OB_NU
1 Doxycycline Pregnancy And Lactation Text: This medication is Pregnancy Category D and not consider safe during pregnancy. It is also excreted in breast milk but is considered safe for shorter treatment courses.

## 2022-10-07 NOTE — BRIEF OPERATIVE NOTE - NSICDXBRIEFPOSTOP_GEN_ALL_CORE_FT
POST-OP DIAGNOSIS:  Antepartum cervical incompetence, second trimester 07-Oct-2022 13:35:30  Elayne Coe  Cervical incompetence during pregnancy, second trimester 07-Oct-2022 13:36:51  Elayne Coe

## 2022-10-07 NOTE — OB PROVIDER TRIAGE NOTE - ADDITIONAL INSTRUCTIONS
Follow up with your doctor as scheduled for continued surveillance and prenatal care. Return to Labor and Delivery if you experience severely painful and regular contractions, leakage of fluid, or vaginal bleeding. Call your doctor if you experience fevers, chills, abdominal pain, or abnormal vaginal discharge, or if you have inability to tolerate oral intake or void spontaneously.

## 2022-10-07 NOTE — OB RN PATIENT PROFILE - FUNCTIONAL ASSESSMENT - BASIC MOBILITY 6.
4-calculated by average/Not able to assess (calculate score using [facility] averaging method)

## 2022-10-07 NOTE — OB PROVIDER TRIAGE NOTE - NSHPLABSRESULTS_GEN_ALL_CORE
Sonograms:  10/4/22: EGA 12w3d, variable presentation, posterior low-lying placenta, NT wnl, CL on trans abdominal sono appears long

## 2022-10-07 NOTE — BRIEF OPERATIVE NOTE - OPERATION/FINDINGS
at 13w0d for hx indicated cerclage. Taj Bazzi cerclage placed, tied at 12 o clock with 8 knots, prolene suture.   Patient and fetus tolerated well with FH at 145 bpm post procedure.

## 2022-10-07 NOTE — OB RN PATIENT PROFILE - SURGICAL SITE INCISION
Duration Of Freeze Thaw-Cycle (Seconds): 0 Show Aperture Variable?: Yes Consent: The patient's consent was obtained including but not limited to risks of crusting, scabbing, blistering, scarring, darker or lighter pigmentary change, recurrence, incomplete removal and infection. Detail Level: Simple Render Note In Bullet Format When Appropriate: No Post-Care Instructions: I reviewed with the patient in detail post-care instructions. Patient is to wear sunprotection, and avoid picking at any of the treated lesions. Pt may apply Vaseline to crusted or scabbing areas. Medical Necessity Clause: This procedure was medically necessary because the lesions that were treated were: Medical Necessity Information: It is in your best interest to select a reason for this procedure from the list below. All of these items fulfill various CMS LCD requirements except the new and changing color options. Detail Level: Detailed no

## 2022-10-07 NOTE — BRIEF OPERATIVE NOTE - NSICDXBRIEFPREOP_GEN_ALL_CORE_FT
PRE-OP DIAGNOSIS:  Cervical incompetence, antepartum, second trimester 07-Oct-2022 13:35:19  Elayne Coe

## 2022-10-07 NOTE — CHART NOTE - NSCHARTNOTEFT_GEN_A_CORE
R3 Chart Note     Patient was seen and examined at bedside after the nurse assist alarm was activated in the bathroom of the recovery room.  Patient was found in the bathroom on the toilet, appeared pale and diaphoretic . Patient was placed in the wheelchair , taken to her recovery room bed. As per patient she has the urge to urinate however when she goes to the bathroom she can only void in small increments.     Voided 100  Bladder scan: 600  Straight cath 600  TAUS: fh+  Abd: Distended and tender to palpation in the bilateral lower quadrants       42 year old  @ 13 weeks POD#0 from a scheduled history indicated Lamb cerclage placement with urinary retention  -Patient with symptomatic improvement once placed in stretcher   -Patient was straight cath'd previously with 700cc output   -Patient DTV 8 hours from straight cath#2  -Continue to ambulate , once patient fully recovers and feel up to it  -Encourage PO hydration  -Continue with IV hydration  -Will continue to monitor    Dr. Frankel ( Chief Resident) and Dr. England( ) at bedside   Ashley Vargas, PGY3      Addendum (11:53p)  Nurse notified that patient had attempted to urinate with only 25cc output. Patient with continued abdominal pain/ distension.    Bladder scan was performed and 700cc appreciated in the bladder   Voided 25cc       42 year old  @ 13 weeks POD#0 from a scheduled history indicated Lamb cerclage placement with urinary retention  -Berger replaced  -To be left in until the AM   -Continue on maintenance fluids   -Monitor urine output    d/w Dr. Hirschberg Jillian Haynes, PGY3

## 2022-10-08 VITALS
SYSTOLIC BLOOD PRESSURE: 104 MMHG | DIASTOLIC BLOOD PRESSURE: 60 MMHG | OXYGEN SATURATION: 100 % | RESPIRATION RATE: 18 BRPM | HEART RATE: 74 BPM

## 2022-10-08 NOTE — PROGRESS NOTE ADULT - ASSESSMENT
41yo  @13w1d presents for scheduled cervical cerclage. Postoperative course c/b urinary retention requiring straight catheterization, and indwelling lerner catheter insertion overnight. Patient denies any acute symptoms. Fetal and maternal status reassuring.     #Urinary Retention  - Lerner in place, d/c this morning and f/u void  - Continue LR@125cc/hr   - Encourage PO hydration    #Maternal Well-Being  - Regular Diet  - DVT ppx: early ambulation    #Fetal Well-Being  - +FHR confirmed preoperatively and postoperatively    Alice Johnson PGY3

## 2022-10-08 NOTE — OB RN TRIAGE NOTE - FALL HARM RISK - UNIVERSAL INTERVENTIONS
Bed in lowest position, wheels locked, appropriate side rails in place/Call bell, personal items and telephone in reach/Instruct patient to call for assistance before getting out of bed or chair/Non-slip footwear when patient is out of bed/[place] to call system/Physically safe environment - no spills, clutter or unnecessary equipment/Purposeful Proactive Rounding/Room/bathroom lighting operational, light cord in reach

## 2022-10-08 NOTE — PROGRESS NOTE ADULT - SUBJECTIVE AND OBJECTIVE BOX
R3 Antepartum Note    HD#2    Patient seen and examined at bedside. Overnight, patient had difficulty voiding spontaneously, required straight catheterization with 700cc urine output upon insertion, followed by indwelling lerner catheter insertion after unsuccessful re-attempt to void with only 25cc of urinary output and 700cc retained on bladder scan. Patient denies any acute complaints, reports relief of pelvic pressure since lerner catheter was placed. Denies cramping/ctx, LOF, or VB. She is tolerating a regular diet.    Vital Signs Last 24 Hours  T(C): 36.6 (10-07-22 @ 13:35), Max: 37.0 (10-07-22 @ 10:41)  HR: 64 (10-07-22 @ 22:45) (56 - 84)  BP: 100/58 (10-07-22 @ 22:45) (98/54 - 113/61)  RR: 23 (10-07-22 @ 18:55) (11 - 24)  SpO2: 100% (10-07-22 @ 22:45) (85% - 100%)    Physical Exam:  General: NAD  Abdomen: Soft, non-tender, gravid  : lerner in place draining adequate amounts of clear yellow urine   Ext: No pain or swelling    Labs:             11.7   4.21  )-----------( 165      ( 10-07 @ 11:09 )             36.7     MEDICATIONS  (STANDING):  lactated ringers. 1000 milliLiter(s) (100 mL/Hr) IV Continuous <Continuous>    MEDICATIONS  (PRN):

## 2022-11-04 ENCOUNTER — ASOB RESULT (OUTPATIENT)
Age: 42
End: 2022-11-04

## 2022-11-04 ENCOUNTER — APPOINTMENT (OUTPATIENT)
Dept: ANTEPARTUM | Facility: CLINIC | Age: 42
End: 2022-11-04

## 2022-11-04 PROCEDURE — 76805 OB US >/= 14 WKS SNGL FETUS: CPT

## 2022-11-04 PROCEDURE — 76817 TRANSVAGINAL US OBSTETRIC: CPT

## 2022-12-02 ENCOUNTER — APPOINTMENT (OUTPATIENT)
Dept: PEDIATRIC CARDIOLOGY | Facility: CLINIC | Age: 42
End: 2022-12-02

## 2022-12-02 PROCEDURE — 99202 OFFICE O/P NEW SF 15 MIN: CPT | Mod: 25

## 2022-12-02 PROCEDURE — 76827 ECHO EXAM OF FETAL HEART: CPT

## 2022-12-02 PROCEDURE — 76820 UMBILICAL ARTERY ECHO: CPT

## 2022-12-02 PROCEDURE — 93325 DOPPLER ECHO COLOR FLOW MAPG: CPT | Mod: 59

## 2022-12-02 PROCEDURE — 76825 ECHO EXAM OF FETAL HEART: CPT

## 2022-12-09 ENCOUNTER — ASOB RESULT (OUTPATIENT)
Age: 42
End: 2022-12-09

## 2022-12-09 ENCOUNTER — APPOINTMENT (OUTPATIENT)
Dept: ANTEPARTUM | Facility: CLINIC | Age: 42
End: 2022-12-09

## 2022-12-09 PROCEDURE — 76817 TRANSVAGINAL US OBSTETRIC: CPT

## 2022-12-09 PROCEDURE — 76811 OB US DETAILED SNGL FETUS: CPT

## 2023-01-03 NOTE — H&P PST ADULT - DOES PATIENT HAVE ADVANCE DIRECTIVE
Progress Note     Red Rule Applied     Service Provided:   This visit was performed via live interactive two way video.  Clinician was at her home and patient was also at her home.  Patient was is agreement to have a 60 minutes  video visit.     Relevant History and Session Note:  Doris talked about her feelings toward her mother.  She was guarded, but slowly she started to talk about how she has been hurt emotionally by her mother and mother is aware but chooses not to change.  Assisted Doris to focus on validating her feelings and think about how her mother will not be able to change, but Doris needs to learn how to be more compassionate with herself.  Assisted processing her thoughts and feelings.        Progress relevant to last session: patient was insightful     Interventions:   Therapist took person-centered approach, engaged in empathetic listening and promoting positive self-regard as it relates to patient's presenting problem of depressed mood and anxiety     Assessment:  Doris presented today as:   Behavior: cooperative  Eye Contact: intact  Speech: fluent  Attention:  attentive  Gait, movement and Motor Coordination: Within normal limits  Alert and Oriented: Yes, to Person, Place, and Time  Mood/Affect: dysphoric  Thought Process: linear  Cognitive Performance:  congruent  Insight and Judgment: good   Self-Harm: denies  Current Suicidal Ideation: Suicidal ideation, plan, or intent reported? denies  Homicidal Ideation: Homicidal ideation, plan, or intent reported?  denies     Diagnosis:  Axis I: Major depression moderate recurrent, BRYAN     Treatment Plan:   Goal #1:  Learn to stop asking people about me. (self esteem)  Therapeutic intervention  Assist her exploring her feelings about self and close family.  Assist patient in identifying her need to avoid any risks with a secondary benefit  Reinforce and practice use of positive statements about self  Assist patient in developing positive self talk as a  way to boost her confidence.  Goal #2: Explore career oriented patterns  Therapeutic intervention  Assist patient to explore career she has in mind and evaluate the match with possible job.  Assist patient to learn and practice decision making to avoid ambivalence  Assist patient looking at the positives she brings to a new job.  Recommendations/Plan:  1. I will continue to follow Doris regularly to provide training in development of better coping strategies in relation to her issues of depression and anxiety    2. Follow up in 3 week(s).                                 No

## 2023-01-20 ENCOUNTER — APPOINTMENT (OUTPATIENT)
Dept: ANTEPARTUM | Facility: CLINIC | Age: 43
End: 2023-01-20
Payer: COMMERCIAL

## 2023-01-20 ENCOUNTER — ASOB RESULT (OUTPATIENT)
Age: 43
End: 2023-01-20

## 2023-01-20 PROCEDURE — 76816 OB US FOLLOW-UP PER FETUS: CPT

## 2023-01-20 PROCEDURE — 76819 FETAL BIOPHYS PROFIL W/O NST: CPT | Mod: 59

## 2023-02-14 NOTE — OB RN PATIENT PROFILE - NSWEIGHTCALCTOOLDRUG_GEN_A_CORE
Called patient with positive influenza B results, symptomatic treatment, push fluids; patient verbalized understanding.   
 used

## 2023-02-17 ENCOUNTER — APPOINTMENT (OUTPATIENT)
Dept: ANTEPARTUM | Facility: CLINIC | Age: 43
End: 2023-02-17
Payer: COMMERCIAL

## 2023-02-17 ENCOUNTER — ASOB RESULT (OUTPATIENT)
Age: 43
End: 2023-02-17

## 2023-02-17 PROCEDURE — 76819 FETAL BIOPHYS PROFIL W/O NST: CPT | Mod: 59

## 2023-02-17 PROCEDURE — 76816 OB US FOLLOW-UP PER FETUS: CPT

## 2023-03-17 ENCOUNTER — APPOINTMENT (OUTPATIENT)
Dept: ANTEPARTUM | Facility: CLINIC | Age: 43
End: 2023-03-17
Payer: COMMERCIAL

## 2023-03-17 ENCOUNTER — ASOB RESULT (OUTPATIENT)
Age: 43
End: 2023-03-17

## 2023-03-17 PROCEDURE — 76816 OB US FOLLOW-UP PER FETUS: CPT

## 2023-03-17 PROCEDURE — 76818 FETAL BIOPHYS PROFILE W/NST: CPT | Mod: 59

## 2023-03-24 ENCOUNTER — ASOB RESULT (OUTPATIENT)
Age: 43
End: 2023-03-24

## 2023-03-24 ENCOUNTER — NON-APPOINTMENT (OUTPATIENT)
Age: 43
End: 2023-03-24

## 2023-03-24 ENCOUNTER — APPOINTMENT (OUTPATIENT)
Dept: ANTEPARTUM | Facility: CLINIC | Age: 43
End: 2023-03-24
Payer: COMMERCIAL

## 2023-03-24 PROCEDURE — 76818 FETAL BIOPHYS PROFILE W/NST: CPT

## 2023-03-27 ENCOUNTER — OUTPATIENT (OUTPATIENT)
Dept: OUTPATIENT SERVICES | Facility: HOSPITAL | Age: 43
LOS: 1 days | End: 2023-03-27
Payer: COMMERCIAL

## 2023-03-27 VITALS
HEIGHT: 61 IN | RESPIRATION RATE: 20 BRPM | HEART RATE: 102 BPM | SYSTOLIC BLOOD PRESSURE: 106 MMHG | WEIGHT: 154.1 LBS | TEMPERATURE: 98 F | DIASTOLIC BLOOD PRESSURE: 69 MMHG | OXYGEN SATURATION: 97 %

## 2023-03-27 DIAGNOSIS — Z01.818 ENCOUNTER FOR OTHER PREPROCEDURAL EXAMINATION: ICD-10-CM

## 2023-03-27 DIAGNOSIS — Z98.891 HISTORY OF UTERINE SCAR FROM PREVIOUS SURGERY: ICD-10-CM

## 2023-03-27 DIAGNOSIS — Z29.9 ENCOUNTER FOR PROPHYLACTIC MEASURES, UNSPECIFIED: ICD-10-CM

## 2023-03-27 DIAGNOSIS — Z98.890 OTHER SPECIFIED POSTPROCEDURAL STATES: Chronic | ICD-10-CM

## 2023-03-27 DIAGNOSIS — Z98.891 HISTORY OF UTERINE SCAR FROM PREVIOUS SURGERY: Chronic | ICD-10-CM

## 2023-03-27 DIAGNOSIS — O34.219 MATERNAL CARE FOR UNSPECIFIED TYPE SCAR FROM PREVIOUS CESAREAN DELIVERY: ICD-10-CM

## 2023-03-27 LAB
ANION GAP SERPL CALC-SCNC: 14 MMOL/L — SIGNIFICANT CHANGE UP (ref 5–17)
BUN SERPL-MCNC: 10 MG/DL — SIGNIFICANT CHANGE UP (ref 7–23)
CALCIUM SERPL-MCNC: 9 MG/DL — SIGNIFICANT CHANGE UP (ref 8.4–10.5)
CHLORIDE SERPL-SCNC: 104 MMOL/L — SIGNIFICANT CHANGE UP (ref 96–108)
CO2 SERPL-SCNC: 19 MMOL/L — LOW (ref 22–31)
CREAT SERPL-MCNC: 0.61 MG/DL — SIGNIFICANT CHANGE UP (ref 0.5–1.3)
EGFR: 115 ML/MIN/1.73M2 — SIGNIFICANT CHANGE UP
GLUCOSE SERPL-MCNC: 114 MG/DL — HIGH (ref 70–99)
HCT VFR BLD CALC: 36.6 % — SIGNIFICANT CHANGE UP (ref 34.5–45)
HGB BLD-MCNC: 11.6 G/DL — SIGNIFICANT CHANGE UP (ref 11.5–15.5)
MCHC RBC-ENTMCNC: 23 PG — LOW (ref 27–34)
MCHC RBC-ENTMCNC: 31.7 GM/DL — LOW (ref 32–36)
MCV RBC AUTO: 72.6 FL — LOW (ref 80–100)
NRBC # BLD: 0 /100 WBCS — SIGNIFICANT CHANGE UP (ref 0–0)
PLATELET # BLD AUTO: 186 K/UL — SIGNIFICANT CHANGE UP (ref 150–400)
POTASSIUM SERPL-MCNC: 3.8 MMOL/L — SIGNIFICANT CHANGE UP (ref 3.5–5.3)
POTASSIUM SERPL-SCNC: 3.8 MMOL/L — SIGNIFICANT CHANGE UP (ref 3.5–5.3)
RBC # BLD: 5.04 M/UL — SIGNIFICANT CHANGE UP (ref 3.8–5.2)
RBC # FLD: 16.2 % — HIGH (ref 10.3–14.5)
SODIUM SERPL-SCNC: 137 MMOL/L — SIGNIFICANT CHANGE UP (ref 135–145)
WBC # BLD: 6.17 K/UL — SIGNIFICANT CHANGE UP (ref 3.8–10.5)
WBC # FLD AUTO: 6.17 K/UL — SIGNIFICANT CHANGE UP (ref 3.8–10.5)

## 2023-03-27 PROCEDURE — G0463: CPT

## 2023-03-27 PROCEDURE — 86900 BLOOD TYPING SEROLOGIC ABO: CPT

## 2023-03-27 PROCEDURE — 86901 BLOOD TYPING SEROLOGIC RH(D): CPT

## 2023-03-27 PROCEDURE — 86850 RBC ANTIBODY SCREEN: CPT

## 2023-03-27 RX ORDER — ACETAMINOPHEN 500 MG
2 TABLET ORAL
Qty: 0 | Refills: 0 | DISCHARGE

## 2023-03-27 RX ORDER — IBUPROFEN 200 MG
3 TABLET ORAL
Qty: 0 | Refills: 0 | DISCHARGE

## 2023-03-27 RX ORDER — FERROUS SULFATE 325(65) MG
1 TABLET ORAL
Refills: 0 | DISCHARGE

## 2023-03-27 NOTE — OB PST NOTE - NSHPROSALLOTHERNEGRD_GEN_ALL_CORE
Patient: Janis Fowler MRN: [id number]  SSN: [id number]   YOB: 1937  Age: [de-identified] y.o. Sex: male     Patient is status post Procedure(s):  INSERTION LEFT UPPER ARM DIALYSIS GRAFT. Surgeon(s) and Role:     * Yolis Schmid MD - Primary                      Peripheral IV 05/13/19 Right Antecubital (Active)   Site Assessment Clean, dry, & intact 5/13/2019  8:27 AM   Phlebitis Assessment 0 5/13/2019  8:27 AM   Infiltration Assessment 0 5/13/2019  8:27 AM   Dressing Status Clean, dry, & intact; New 5/13/2019  8:27 AM   Dressing Type Tape;Transparent 5/13/2019  8:27 AM   Hub Color/Line Status Blue; Infusing 5/13/2019  8:27 AM   Action Taken Blood drawn 5/13/2019  8:27 AM        Hemodialysis Access (Active)                       Dressing/Packing:  Wound Arm Left-Dressing Type: 4 x 4;Special tape (comment) (05/13/19 1018)    Splint/Cast:  ] All other review of systems negative, except as noted in HPI

## 2023-03-27 NOTE — OB PST NOTE - ASSESSMENT
JOSEI VTE 2.0 SCORE [CLOT updated 2019]    AGE RELATED RISK FACTORS                                                       MOBILITY RELATED FACTORS  [X ] Age 41-60 years                                            (1 Point)                    [ ] Bed rest                                                        (1 Point)  [ ] Age: 61-74 years                                           (2 Points)                  [ ] Plaster cast                                                   (2 Points)  [ ] Age= 75 years                                              (3 Points)                    [ ] Bed bound for more than 72 hours                 (2 Points)    DISEASE RELATED RISK FACTORS                                               GENDER SPECIFIC FACTORS  [ ] Edema in the lower extremities                       (1 Point)              [X ] Pregnancy                                                     (1 Point)  [ ] Varicose veins                                               (1 Point)                     [ ] Post-partum < 6 weeks                                   (1 Point)             [X ] BMI > 25 Kg/m2                                            (1 Point)                     [ ] Hormonal therapy  or oral contraception          (1 Point)                 [ ] Sepsis (in the previous month)                        (1 Point)               [ ] History of pregnancy complications                 (1 point)  [ ] Pneumonia or serious lung disease                                               [ ] Unexplained or recurrent                     (1 Point)           (in the previous month)                               (1 Point)  [ ] Abnormal pulmonary function test                     (1 Point)                 SURGERY RELATED RISK FACTORS  [ ] Acute myocardial infarction                              (1 Point)               [X ]  Section                                             (1 Point)  [ ] Congestive heart failure (in the previous month)  (1 Point)      [ ] Minor surgery                                                  (1 Point)   [ ] Inflammatory bowel disease                             (1 Point)               [ ] Arthroscopic surgery                                        (2 Points)  [ ] Central venous access                                      (2 Points)                [ ] General surgery lasting more than 45 minutes (2 points)  [ ] Malignancy- Present or previous                   (2 Points)                [ ] Elective arthroplasty                                         (5 points)    [ ] Stroke (in the previous month)                          (5 Points)                                                                                                                                                           HEMATOLOGY RELATED FACTORS                                                 TRAUMA RELATED RISK FACTORS  [ ] Prior episodes of VTE                                     (3 Points)                [ ] Fracture of the hip, pelvis, or leg                       (5 Points)  [ ] Positive family history for VTE                         (3 Points)             [ ] Acute spinal cord injury (in the previous month)  (5 Points)  [ ] Prothrombin 69752 A                                     (3 Points)               [ ] Paralysis  (less than 1 month)                             (5 Points)  [ ] Factor V Leiden                                             (3 Points)                  [ ] Multiple Trauma within 1 month                        (5 Points)  [ ] Lupus anticoagulants                                     (3 Points)                                                           [ ] Anticardiolipin antibodies                               (3 Points)                                                       [ ] High homocysteine in the blood                      (3 Points)                                             [ ] Other congenital or acquired thrombophilia      (3 Points)                                                [ ] Heparin induced thrombocytopenia                  (3 Points)                                     Total Score [     4     ]

## 2023-03-27 NOTE — OB PST NOTE - NSICDXPASTSURGICALHX_GEN_ALL_CORE_FT
PAST SURGICAL HISTORY:  History of  section '     S/P myomectomy      PAST SURGICAL HISTORY:  History of  section '     S/P dilatation and curettage     S/P myomectomy

## 2023-03-27 NOTE — OB PST NOTE - NS_OBGYNHISTORY_OBGYN_ALL_OB_FT
cervical incompetence   s/p myomectomy cervical incompetence - cerclage 10/2022  s/p myomectomy- 2017, 2020  s/p d & e 2 18 wks - 3/17/2021

## 2023-03-27 NOTE — OB PST NOTE - HISTORY OF PRESENT ILLNESS
42 yr old female, ,   Coming in for Repeat  , EDC 4/15/2023.     Denies Recent travel, Exposure or Covid symptoms  Covid test   42 yr old female, , h/o uterine fibroid- s/p myomectomy 2017,  labor @16 wks -, cervical incompetence - s/p cerclage 10/7/2022, 38 weeks pregnant Coming in for Repeat  , EDC 4/15/2023.     Denies Recent travel, Exposure or Covid symptoms  Covid test- 2023

## 2023-03-27 NOTE — OB PST NOTE - FALL HARM RISK - FALLEN IN PAST
History     Chief Complaint   Patient presents with     Dental Pain     HPI  Riley Daniel is a 32 year old male who presents to the emergency room today with complaints of left lower dental pain, denies any fever, nausea or vomiting.  Patient was told by the dentist earlier this week that he needs a tooth pulled but did not have thousands of dollars to pay for that.  Patient has been taking ibuprofen and Tylenol for pain with no relief in his symptoms.    Allergies:  Allergies   Allergen Reactions     Phenergan [Promethazine] Other (See Comments)     Hallucinations     Monistat [Miconazole] Rash       Problem List:    There are no active problems to display for this patient.       Past Medical History:    History reviewed. No pertinent past medical history.    Past Surgical History:    History reviewed. No pertinent surgical history.    Family History:    History reviewed. No pertinent family history.    Social History:  Marital Status:  Single [1]  Social History     Tobacco Use     Smoking status: Former Smoker     Smokeless tobacco: Current User     Tobacco comment: 1 tin/day   Substance Use Topics     Alcohol use: Yes     Comment: occ     Drug use: Not Currently        Medications:    clindamycin (CLEOCIN) 300 MG capsule  HYDROcodone-acetaminophen (NORCO) 5-325 MG tablet  ibuprofen (ADVIL/MOTRIN) 200 MG capsule          Review of Systems   All other systems reviewed and are negative.      Physical Exam   BP: (!) 149/101  Pulse: 106  Heart Rate: 99  Temp: 97.8  F (36.6  C)  Resp: 16  Weight: 101.6 kg (224 lb)  SpO2: 97 %      Physical Exam  HENT:      Head: Normocephalic.      Mouth/Throat:      Mouth: Mucous membranes are moist.      Comments: Overall poor dentition, fluctuant abscess surrounding back left lower molar, uvula is midline, no ludwigs angina  Eyes:      Extraocular Movements: Extraocular movements intact.   Neck:      Musculoskeletal: Normal range of motion and neck supple.   Cardiovascular:       Rate and Rhythm: Tachycardia present.      Pulses: Normal pulses.   Pulmonary:      Effort: Pulmonary effort is normal.      Breath sounds: Normal breath sounds.   Lymphadenopathy:      Cervical: No cervical adenopathy.   Skin:     General: Skin is warm.      Capillary Refill: Capillary refill takes less than 2 seconds.   Neurological:      General: No focal deficit present.      Mental Status: He is alert.   Psychiatric:         Mood and Affect: Mood normal.         ED Course        Procedures     Incision and drainage of left lower molar tooth abscess, small straight incision was made with an 11 blade which patient tolerated well with large amounts of copious purulent drainage    Dental Block:     After discussion of the different options to treat his dental pain we elected to perform a dental block. Informed consent was given to include a discussion of the possible side affects of a dental block. These include: pain, bleeding, infection, nerve injury, allergic reaction, and, although very rare, even a possible severe reaction to the point of death. Riley agrees to proceed with the dental block.     A time out was performed. The area of pain was verified.    Using Marcaine 0.5% with epinephrine 2 ml was used to perform the dental block to the left inferior alveolar. Riley tolerated the procedure/injection well with sign of adverse reaction.    I will monitor the patient over the next 20 minutes both for signs of improved pain and for possible side effect of he block procedure.      No results found for this or any previous visit (from the past 24 hour(s)).    Medications - No data to display    Assessments & Plan (with Medical Decision Making)  Dental abscess  Pain improved here with dental block, I&D done to relieve abscess with large amounts of purulent drainage.  Patient tolerated procedure well with no complications.  Patient will be started on clindamycin given his recent use of amoxicillin and sent home  with Norco for pain if ibuprofen and Tylenol are ineffective.  Patient instructed to follow-up with a dentist as soon as possible, reasons to return to the emergency room were discussed in detail.  Narcotic safety and side effects were discussed in detail.  Patient agreeable to plan of care and discharged in stable condition.     I have reviewed the nursing notes.    I have reviewed the findings, diagnosis, plan and need for follow up with the patient.    Discharge Medication List as of 12/29/2019  6:23 PM      START taking these medications    Details   clindamycin (CLEOCIN) 300 MG capsule Take 1 capsule (300 mg) by mouth 4 times daily for 10 days, Disp-40 capsule, R-0, Local Print      HYDROcodone-acetaminophen (NORCO) 5-325 MG tablet Take 1-2 tablets by mouth every 6 hours as needed for pain, Disp-18 tablet, R-0, Local Print             Final diagnoses:   Dental abscess       12/29/2019   [facility] EMERGENCY DEPARTMENT     Nancy Campbell, MARY CNP  12/29/19 [id number]     No

## 2023-03-27 NOTE — OB PST NOTE - PROBLEM SELECTOR PLAN 1
Repeat  , EDC 4/15/2023  Chlorhexidine wash give Pre-Op  Instructed to drink Gatorade 20 oz , to be completed 2 hrs pre-op

## 2023-03-31 ENCOUNTER — APPOINTMENT (OUTPATIENT)
Dept: ANTEPARTUM | Facility: CLINIC | Age: 43
End: 2023-03-31
Payer: COMMERCIAL

## 2023-03-31 ENCOUNTER — ASOB RESULT (OUTPATIENT)
Age: 43
End: 2023-03-31

## 2023-03-31 PROCEDURE — 76818 FETAL BIOPHYS PROFILE W/NST: CPT

## 2023-04-05 ENCOUNTER — OUTPATIENT (OUTPATIENT)
Dept: OUTPATIENT SERVICES | Facility: HOSPITAL | Age: 43
LOS: 1 days | End: 2023-04-05
Payer: COMMERCIAL

## 2023-04-05 ENCOUNTER — TRANSCRIPTION ENCOUNTER (OUTPATIENT)
Age: 43
End: 2023-04-05

## 2023-04-05 DIAGNOSIS — Z98.890 OTHER SPECIFIED POSTPROCEDURAL STATES: Chronic | ICD-10-CM

## 2023-04-05 DIAGNOSIS — Z98.891 HISTORY OF UTERINE SCAR FROM PREVIOUS SURGERY: Chronic | ICD-10-CM

## 2023-04-05 DIAGNOSIS — Z11.52 ENCOUNTER FOR SCREENING FOR COVID-19: ICD-10-CM

## 2023-04-05 LAB — SARS-COV-2 RNA SPEC QL NAA+PROBE: SIGNIFICANT CHANGE UP

## 2023-04-05 PROCEDURE — U0003: CPT

## 2023-04-05 PROCEDURE — U0005: CPT

## 2023-04-05 PROCEDURE — C9803: CPT

## 2023-04-06 ENCOUNTER — INPATIENT (INPATIENT)
Facility: HOSPITAL | Age: 43
LOS: 2 days | Discharge: HOME | End: 2023-04-09
Attending: OBSTETRICS & GYNECOLOGY | Admitting: OBSTETRICS & GYNECOLOGY
Payer: COMMERCIAL

## 2023-04-06 ENCOUNTER — APPOINTMENT (OUTPATIENT)
Dept: ANTEPARTUM | Facility: CLINIC | Age: 43
End: 2023-04-06
Payer: COMMERCIAL

## 2023-04-06 ENCOUNTER — ASOB RESULT (OUTPATIENT)
Age: 43
End: 2023-04-06

## 2023-04-06 VITALS
SYSTOLIC BLOOD PRESSURE: 130 MMHG | TEMPERATURE: 98 F | DIASTOLIC BLOOD PRESSURE: 77 MMHG | RESPIRATION RATE: 18 BRPM | HEART RATE: 91 BPM

## 2023-04-06 DIAGNOSIS — Z98.890 OTHER SPECIFIED POSTPROCEDURAL STATES: Chronic | ICD-10-CM

## 2023-04-06 DIAGNOSIS — O26.899 OTHER SPECIFIED PREGNANCY RELATED CONDITIONS, UNSPECIFIED TRIMESTER: ICD-10-CM

## 2023-04-06 DIAGNOSIS — Z34.80 ENCOUNTER FOR SUPERVISION OF OTHER NORMAL PREGNANCY, UNSPECIFIED TRIMESTER: ICD-10-CM

## 2023-04-06 DIAGNOSIS — Z98.891 HISTORY OF UTERINE SCAR FROM PREVIOUS SURGERY: Chronic | ICD-10-CM

## 2023-04-06 LAB
ANISOCYTOSIS BLD QL: SLIGHT — SIGNIFICANT CHANGE UP
BASOPHILS # BLD AUTO: 0.07 K/UL — SIGNIFICANT CHANGE UP (ref 0–0.2)
BASOPHILS NFR BLD AUTO: 0.9 % — SIGNIFICANT CHANGE UP (ref 0–2)
BLD GP AB SCN SERPL QL: NEGATIVE — SIGNIFICANT CHANGE UP
COVID-19 SPIKE DOMAIN AB INTERP: POSITIVE
COVID-19 SPIKE DOMAIN ANTIBODY RESULT: >250 U/ML — HIGH
DACRYOCYTES BLD QL SMEAR: SLIGHT — SIGNIFICANT CHANGE UP
ELLIPTOCYTES BLD QL SMEAR: SLIGHT — SIGNIFICANT CHANGE UP
EOSINOPHIL # BLD AUTO: 0.26 K/UL — SIGNIFICANT CHANGE UP (ref 0–0.5)
EOSINOPHIL NFR BLD AUTO: 3.4 % — SIGNIFICANT CHANGE UP (ref 0–6)
GIANT PLATELETS BLD QL SMEAR: PRESENT — SIGNIFICANT CHANGE UP
HCT VFR BLD CALC: 38.2 % — SIGNIFICANT CHANGE UP (ref 34.5–45)
HGB BLD-MCNC: 12.1 G/DL — SIGNIFICANT CHANGE UP (ref 11.5–15.5)
LYMPHOCYTES # BLD AUTO: 2.15 K/UL — SIGNIFICANT CHANGE UP (ref 1–3.3)
LYMPHOCYTES # BLD AUTO: 28.2 % — SIGNIFICANT CHANGE UP (ref 13–44)
MACROCYTES BLD QL: SLIGHT — SIGNIFICANT CHANGE UP
MANUAL SMEAR VERIFICATION: SIGNIFICANT CHANGE UP
MCHC RBC-ENTMCNC: 23.2 PG — LOW (ref 27–34)
MCHC RBC-ENTMCNC: 31.7 GM/DL — LOW (ref 32–36)
MCV RBC AUTO: 73.3 FL — LOW (ref 80–100)
MICROCYTES BLD QL: SIGNIFICANT CHANGE UP
MONOCYTES # BLD AUTO: 0.65 K/UL — SIGNIFICANT CHANGE UP (ref 0–0.9)
MONOCYTES NFR BLD AUTO: 8.5 % — SIGNIFICANT CHANGE UP (ref 2–14)
NEUTROPHILS # BLD AUTO: 4.5 K/UL — SIGNIFICANT CHANGE UP (ref 1.8–7.4)
NEUTROPHILS NFR BLD AUTO: 59 % — SIGNIFICANT CHANGE UP (ref 43–77)
OVALOCYTES BLD QL SMEAR: SLIGHT — SIGNIFICANT CHANGE UP
PLAT MORPH BLD: ABNORMAL
PLATELET # BLD AUTO: 176 K/UL — SIGNIFICANT CHANGE UP (ref 150–400)
POLYCHROMASIA BLD QL SMEAR: SLIGHT — SIGNIFICANT CHANGE UP
RBC # BLD: 5.21 M/UL — HIGH (ref 3.8–5.2)
RBC # FLD: 16.7 % — HIGH (ref 10.3–14.5)
RBC BLD AUTO: ABNORMAL
RH IG SCN BLD-IMP: POSITIVE — SIGNIFICANT CHANGE UP
SARS-COV-2 IGG+IGM SERPL QL IA: >250 U/ML — HIGH
SARS-COV-2 IGG+IGM SERPL QL IA: POSITIVE
WBC # BLD: 7.63 K/UL — SIGNIFICANT CHANGE UP (ref 3.8–10.5)
WBC # FLD AUTO: 7.63 K/UL — SIGNIFICANT CHANGE UP (ref 3.8–10.5)

## 2023-04-06 PROCEDURE — 88304 TISSUE EXAM BY PATHOLOGIST: CPT | Mod: 26

## 2023-04-06 PROCEDURE — 76818 FETAL BIOPHYS PROFILE W/NST: CPT

## 2023-04-06 PROCEDURE — 88307 TISSUE EXAM BY PATHOLOGIST: CPT | Mod: 26

## 2023-04-06 PROCEDURE — 88302 TISSUE EXAM BY PATHOLOGIST: CPT | Mod: 26

## 2023-04-06 RX ORDER — MAGNESIUM HYDROXIDE 400 MG/1
30 TABLET, CHEWABLE ORAL
Refills: 0 | Status: DISCONTINUED | OUTPATIENT
Start: 2023-04-06 | End: 2023-04-09

## 2023-04-06 RX ORDER — OXYTOCIN 10 UNIT/ML
333.33 VIAL (ML) INJECTION
Qty: 20 | Refills: 0 | Status: DISCONTINUED | OUTPATIENT
Start: 2023-04-06 | End: 2023-04-09

## 2023-04-06 RX ORDER — FAMOTIDINE 10 MG/ML
20 INJECTION INTRAVENOUS ONCE
Refills: 0 | Status: COMPLETED | OUTPATIENT
Start: 2023-04-06 | End: 2023-04-06

## 2023-04-06 RX ORDER — OXYCODONE HYDROCHLORIDE 5 MG/1
5 TABLET ORAL
Refills: 0 | Status: COMPLETED | OUTPATIENT
Start: 2023-04-06 | End: 2023-04-13

## 2023-04-06 RX ORDER — LANOLIN
1 OINTMENT (GRAM) TOPICAL EVERY 6 HOURS
Refills: 0 | Status: DISCONTINUED | OUTPATIENT
Start: 2023-04-06 | End: 2023-04-09

## 2023-04-06 RX ORDER — DIPHENHYDRAMINE HCL 50 MG
25 CAPSULE ORAL EVERY 6 HOURS
Refills: 0 | Status: DISCONTINUED | OUTPATIENT
Start: 2023-04-06 | End: 2023-04-09

## 2023-04-06 RX ORDER — NALBUPHINE HYDROCHLORIDE 10 MG/ML
2.5 INJECTION, SOLUTION INTRAMUSCULAR; INTRAVENOUS; SUBCUTANEOUS EVERY 6 HOURS
Refills: 0 | Status: DISCONTINUED | OUTPATIENT
Start: 2023-04-06 | End: 2023-04-07

## 2023-04-06 RX ORDER — NALOXONE HYDROCHLORIDE 4 MG/.1ML
0.1 SPRAY NASAL
Refills: 0 | Status: DISCONTINUED | OUTPATIENT
Start: 2023-04-06 | End: 2023-04-07

## 2023-04-06 RX ORDER — KETOROLAC TROMETHAMINE 30 MG/ML
30 SYRINGE (ML) INJECTION EVERY 6 HOURS
Refills: 0 | Status: COMPLETED | OUTPATIENT
Start: 2023-04-06 | End: 2023-04-07

## 2023-04-06 RX ORDER — SODIUM CHLORIDE 9 MG/ML
1000 INJECTION, SOLUTION INTRAVENOUS ONCE
Refills: 0 | Status: COMPLETED | OUTPATIENT
Start: 2023-04-06 | End: 2023-04-06

## 2023-04-06 RX ORDER — CITRIC ACID/SODIUM CITRATE 300-500 MG
30 SOLUTION, ORAL ORAL ONCE
Refills: 0 | Status: COMPLETED | OUTPATIENT
Start: 2023-04-06 | End: 2023-04-06

## 2023-04-06 RX ORDER — ACETAMINOPHEN 500 MG
975 TABLET ORAL
Refills: 0 | Status: DISCONTINUED | OUTPATIENT
Start: 2023-04-06 | End: 2023-04-09

## 2023-04-06 RX ORDER — CEFAZOLIN SODIUM 1 G
2000 VIAL (EA) INJECTION ONCE
Refills: 0 | Status: COMPLETED | OUTPATIENT
Start: 2023-04-06 | End: 2023-04-06

## 2023-04-06 RX ORDER — OXYCODONE HYDROCHLORIDE 5 MG/1
5 TABLET ORAL
Refills: 0 | Status: DISCONTINUED | OUTPATIENT
Start: 2023-04-06 | End: 2023-04-07

## 2023-04-06 RX ORDER — OXYCODONE HYDROCHLORIDE 5 MG/1
5 TABLET ORAL ONCE
Refills: 0 | Status: DISCONTINUED | OUTPATIENT
Start: 2023-04-06 | End: 2023-04-09

## 2023-04-06 RX ORDER — DEXAMETHASONE 0.5 MG/5ML
4 ELIXIR ORAL EVERY 6 HOURS
Refills: 0 | Status: DISCONTINUED | OUTPATIENT
Start: 2023-04-06 | End: 2023-04-07

## 2023-04-06 RX ORDER — HEPARIN SODIUM 5000 [USP'U]/ML
5000 INJECTION INTRAVENOUS; SUBCUTANEOUS EVERY 12 HOURS
Refills: 0 | Status: DISCONTINUED | OUTPATIENT
Start: 2023-04-07 | End: 2023-04-09

## 2023-04-06 RX ORDER — ONDANSETRON 8 MG/1
4 TABLET, FILM COATED ORAL EVERY 6 HOURS
Refills: 0 | Status: DISCONTINUED | OUTPATIENT
Start: 2023-04-06 | End: 2023-04-07

## 2023-04-06 RX ORDER — SODIUM CHLORIDE 9 MG/ML
1000 INJECTION, SOLUTION INTRAVENOUS
Refills: 0 | Status: DISCONTINUED | OUTPATIENT
Start: 2023-04-06 | End: 2023-04-09

## 2023-04-06 RX ORDER — IBUPROFEN 200 MG
600 TABLET ORAL EVERY 6 HOURS
Refills: 0 | Status: COMPLETED | OUTPATIENT
Start: 2023-04-06 | End: 2024-03-04

## 2023-04-06 RX ORDER — MORPHINE SULFATE 50 MG/1
0.1 CAPSULE, EXTENDED RELEASE ORAL ONCE
Refills: 0 | Status: DISCONTINUED | OUTPATIENT
Start: 2023-04-06 | End: 2023-04-07

## 2023-04-06 RX ORDER — TETANUS TOXOID, REDUCED DIPHTHERIA TOXOID AND ACELLULAR PERTUSSIS VACCINE, ADSORBED 5; 2.5; 8; 8; 2.5 [IU]/.5ML; [IU]/.5ML; UG/.5ML; UG/.5ML; UG/.5ML
0.5 SUSPENSION INTRAMUSCULAR ONCE
Refills: 0 | Status: DISCONTINUED | OUTPATIENT
Start: 2023-04-06 | End: 2023-04-09

## 2023-04-06 RX ORDER — OXYCODONE HYDROCHLORIDE 5 MG/1
10 TABLET ORAL
Refills: 0 | Status: DISCONTINUED | OUTPATIENT
Start: 2023-04-06 | End: 2023-04-07

## 2023-04-06 RX ORDER — SODIUM CHLORIDE 9 MG/ML
1000 INJECTION, SOLUTION INTRAVENOUS
Refills: 0 | Status: DISCONTINUED | OUTPATIENT
Start: 2023-04-06 | End: 2023-04-07

## 2023-04-06 RX ORDER — SIMETHICONE 80 MG/1
80 TABLET, CHEWABLE ORAL EVERY 4 HOURS
Refills: 0 | Status: DISCONTINUED | OUTPATIENT
Start: 2023-04-06 | End: 2023-04-09

## 2023-04-06 RX ADMIN — FAMOTIDINE 20 MILLIGRAM(S): 10 INJECTION INTRAVENOUS at 18:31

## 2023-04-06 RX ADMIN — Medication 30 MILLILITER(S): at 18:32

## 2023-04-06 RX ADMIN — SODIUM CHLORIDE 2000 MILLILITER(S): 9 INJECTION, SOLUTION INTRAVENOUS at 18:00

## 2023-04-06 NOTE — OB PROVIDER DELIVERY SUMMARY - NSSELHIDDEN_OBGYN_ALL_OB_FT
[NS_DeliveryAttending1_OBGYN_ALL_OB_FT:[id number]],[NS_DeliveryAssist1_OBGYN_ALL_OB_FT:[id number]=],[NS_DeliveryRN_OBGYN_ALL_OB_FT:[id number]=]

## 2023-04-06 NOTE — OB PROVIDER H&P - ASSESSMENT
43yo  @ 38 6/7 wks (LMP 22) for repeat c/section and cerclage removal  Cat II FHT    Admit to L&D  EFM/Morven  Routine labs  IVFluids  NPO/Bicitra  Anesthesia c/s  PreOp Prep  D/W Dr. Khan

## 2023-04-06 NOTE — OB PROVIDER TRIAGE NOTE - NSHPPHYSICALEXAM_GEN_ALL_CORE
PE:  T(C): 36.8 (04-06-23 @ 16:52), Max: 36.8 (04-06-23 @ 16:36)  HR: 91 (04-06-23 @ 17:03) (91 - 107)  BP: 130/77 (04-06-23 @ 16:52) (130/77 - 130/77)  RR: 18 (04-06-23 @ 16:52) (18 - 18)  SpO2: 97% (04-06-23 @ 17:03) (97% - 100%)  CV: RRR  Lungs: CTA B/L  Abd: soft/NT, gravid  EFM:130/ mod luis/ (+) acc/ (-) decel  [place]: occ ctx  VE: deferred at this time  BSUS BPP at ATU 8/8

## 2023-04-06 NOTE — OB PROVIDER DELIVERY SUMMARY - NSPROVIDERDELIVERYNOTE_OBGYN_ALL_OB_FT
rLTCS of viable female infant, vertex LOT presentation  globular appearing uterus with few subserosal fibroids, normal appearing b/l fallopian tubes and ovaries  bladder densely adhered to rectus muscle, otherwise minimal intra-abdominal adhesions  bilateral salpingectomy performed  skin tag removed from near umbilicus  Lamb cerclage removed at conclusion of case  547/1700/200  Dication #[id number] rLTCS of viable female infant, vertex LOT presentation  globular appearing uterus with few subserosal fibroids, normal appearing b/l fallopian tubes and ovaries  bladder densely adhered to rectus muscle, otherwise minimal intra-abdominal adhesions  bilateral salpingectomy performed  skin tag removed from near umbilicus  Lamb cerclage removed at conclusion of case  547/1700/200  Dictation #[id number]

## 2023-04-06 NOTE — OB PROVIDER H&P - NSHPPHYSICALEXAM_GEN_ALL_CORE
· Physical Exam  PE:  T(C): 36.8 (04-06-23 @ 16:52), Max: 36.8 (04-06-23 @ 16:36)  HR: 91 (04-06-23 @ 17:03) (91 - 107)  BP: 130/77 (04-06-23 @ 16:52) (130/77 - 130/77)  RR: 18 (04-06-23 @ 16:52) (18 - 18)  SpO2: 97% (04-06-23 @ 17:03) (97% - 100%)  CV: RRR  Lungs: CTA B/L  Abd: soft/NT, gravid  EFM:130/ mod luis/ (+) acc/ (+) occ late decel  [place]: occ ctx  VE: deferred at this time  BSUS BPP at ATU 8/8

## 2023-04-06 NOTE — OB RN TRIAGE NOTE - NSICDXPASTSURGICALHX_GEN_ALL_CORE_FT
PAST SURGICAL HISTORY:  History of  section '     S/P dilatation and curettage     S/P myomectomy

## 2023-04-06 NOTE — OB RN DELIVERY SUMMARY - NSSELHIDDEN_OBGYN_ALL_OB_FT
[NS_DeliveryAttending1_OBGYN_ALL_OB_FT:[id number]=],[NS_DeliveryAssist1_OBGYN_ALL_OB_FT:[id number]=],[NS_DeliveryRN_OBGYN_ALL_OB_FT:[id number]=]

## 2023-04-06 NOTE — OB RN DELIVERY SUMMARY - NS_SEPSISRSKCALC_OBGYN_ALL_OB_FT
EOS calculated successfully. EOS Risk Factor: 0.5/1000 live births ([facility] national incidence); GA=38w6d; Temp=98.42; ROM=0.017; GBS='Negative'; Antibiotics='No antibiotics or any antibiotics < 2 hrs prior to birth'

## 2023-04-06 NOTE — OB PROVIDER TRIAGE NOTE - NSOBPROVIDERNOTE_OBGYN_ALL_OB_FT
A/P: 41yo  @ 38 6/7 wks (LMP 22) presents from [facility] for prolonged fetal monitoring, BPP /8.   For prolonged fetal monitoring  Last ate at 1230p

## 2023-04-06 NOTE — OB NEONATOLOGY/PEDIATRICIAN DELIVERY SUMMARY - NSPEDSNEONOTESA_OBGYN_ALL_OB_FT
NICU fellow already present upon Peds NP arrival at ~8 MOL    Baby girl, AGA, born on  (22:06) at 38.6 wks via R-CS to a 42 y/o , O+ blood type mother. Maternal history of CSx1, MISx1, anemia, myomectomy (+). No significant prenatal history. PNL nr/immune/-, GBS - on , COVID neg on . AROM at delivery with clear fluids. Baby emerged with body cordx1 was w/d/s/s with APGARS of 8/9, CPAP 5/21% started by nurse due to retractions and O2 sats in low 80s, NICU fellow + Peds NP called at ~6 MOL, received deep suctioning and CPAP x ~20 minutes. Mom would like to breastfeed, consents Hep B. Tmax: 37C. EOS: N/A (no labor no rupture).    Physical Exam:  Gen: NAD, +grimace  HEENT: anterior fontanel open soft and flat, no cleft lip/palate, ears normal set, no ear pits or tags. no lesions in mouth/throat, nares clinically patent  Resp: no increased work of breathing, good air entry b/l, clear to auscultation bilaterally  Cardio: Normal S1/S2, regular rate and rhythm, no murmurs, rubs or gallops  Abd: soft, non tender, non distended, + bowel sounds, umbilical cord with 3 vessels  Neuro: +grasp/suck/laurie, normal tone  Extremities: negative polo and ortolani, moving all extremities, full range of motion x 4, no crepitus  Skin: pink, warm  Genitals: Normal female anatomy, Armani 1, anus patent

## 2023-04-06 NOTE — OB PROVIDER TRIAGE NOTE - HISTORY OF PRESENT ILLNESS
41yo  @ 38 6/7 wks (LMP 22) presents from [facility] for prolonged fetal monitoring, BPP 8/8. (+) occ mild cramping  (+) fetal movement. Denies leakage of fluid or vaginal bleeding. History indicated prophylactic cerclage placed.     EFW 3200  GBS Neg     Ax: NKDA  MedHx: denies  Meds: PNV, Fe, ASA  ObHx:    pLTCS NRFHT Male 10#   cervical insuff D&E @ 16wks     GynHx: (+) small fibroid. denies abnl paps/cysts/ fibroids/endometriosis/ HPV  SurgHx: s/p myomectomy 2017 & , c/sec x 1  FamHx: Mom & Dad HTN  Pt accepts blood products

## 2023-04-06 NOTE — OB RN DELIVERY SUMMARY - NS_NEWBORNACONDIT_OBGYN_ALL_OB
68 yo F with PMHx of HTN presents to the ED c/o moderate non-traumatic left shoulder pain/spasm that radiates into left anterior chest wall and left side of neck. Symptoms have been intermittent over the past few weeks but have been worsening. She admits to associated palpitations. She denies aggravating/alleviating factors. Last stress test was few years ago. Pt denies fever, chills, nausea, vomiting, abdominal pain, diarrhea, headache, dizziness, weakness, SOB, back pain, LOC, trauma, urinary symptoms, cough, calf pain/swelling, recent travel, recent surgery.
Liveborn

## 2023-04-06 NOTE — OB RN INTRAOPERATIVE NOTE - NSSELHIDDEN_OBGYN_ALL_OB_FT
[NS_DeliveryAttending1_OBGYN_ALL_OB_FT:[id number]],[NS_DeliveryAssist1_OBGYN_ALL_OB_FT:[id number]=],[NS_DeliveryRN_OBGYN_ALL_OB_FT:[id number]=]

## 2023-04-06 NOTE — OB PROVIDER H&P - HISTORY OF PRESENT ILLNESS
41yo  @ 38 6/7 wks (LMP 22) for repeat c/section and cerclage removal. Pt presented from [facility] for prolonged fetal monitoring, having ocassional late decels on L&D.  (+) occ mild cramping  (+) fetal movement. Denies leakage of fluid or vaginal bleeding. History indicated prophylactic cerclage placed.     EFW 3200  GBS Neg     Ax: NKDA  MedHx: denies  Meds: PNV, Fe, ASA  ObHx:    pLTCS NRFHT Male 10#   cervical insuff D&E @ 16wks     GynHx: (+) small fibroid. denies abnl paps/cysts/ fibroids/endometriosis/ HPV  SurgHx: s/p myomectomy  & , c/sec x 1  FamHx: Mom & Dad HTN  Pt accepts blood products

## 2023-04-07 ENCOUNTER — TRANSCRIPTION ENCOUNTER (OUTPATIENT)
Age: 43
End: 2023-04-07

## 2023-04-07 DIAGNOSIS — Z90.79 ACQUIRED ABSENCE OF OTHER GENITAL ORGAN(S): ICD-10-CM

## 2023-04-07 LAB
BASOPHILS # BLD AUTO: 0.02 K/UL — SIGNIFICANT CHANGE UP (ref 0–0.2)
BASOPHILS NFR BLD AUTO: 0.1 % — SIGNIFICANT CHANGE UP (ref 0–2)
EOSINOPHIL # BLD AUTO: 0 K/UL — SIGNIFICANT CHANGE UP (ref 0–0.5)
EOSINOPHIL NFR BLD AUTO: 0 % — SIGNIFICANT CHANGE UP (ref 0–6)
HCT VFR BLD CALC: 37.4 % — SIGNIFICANT CHANGE UP (ref 34.5–45)
HGB BLD-MCNC: 11.4 G/DL — LOW (ref 11.5–15.5)
IMM GRANULOCYTES NFR BLD AUTO: 0.4 % — SIGNIFICANT CHANGE UP (ref 0–0.9)
LYMPHOCYTES # BLD AUTO: 1.04 K/UL — SIGNIFICANT CHANGE UP (ref 1–3.3)
LYMPHOCYTES # BLD AUTO: 6.9 % — LOW (ref 13–44)
MCHC RBC-ENTMCNC: 22.8 PG — LOW (ref 27–34)
MCHC RBC-ENTMCNC: 30.5 GM/DL — LOW (ref 32–36)
MCV RBC AUTO: 74.7 FL — LOW (ref 80–100)
MONOCYTES # BLD AUTO: 1.02 K/UL — HIGH (ref 0–0.9)
MONOCYTES NFR BLD AUTO: 6.8 % — SIGNIFICANT CHANGE UP (ref 2–14)
NEUTROPHILS # BLD AUTO: 12.85 K/UL — HIGH (ref 1.8–7.4)
NEUTROPHILS NFR BLD AUTO: 85.8 % — HIGH (ref 43–77)
NRBC # BLD: 0 /100 WBCS — SIGNIFICANT CHANGE UP (ref 0–0)
PLATELET # BLD AUTO: 178 K/UL — SIGNIFICANT CHANGE UP (ref 150–400)
RBC # BLD: 5.01 M/UL — SIGNIFICANT CHANGE UP (ref 3.8–5.2)
RBC # FLD: 16.7 % — HIGH (ref 10.3–14.5)
T PALLIDUM AB TITR SER: NEGATIVE — SIGNIFICANT CHANGE UP
WBC # BLD: 14.99 K/UL — HIGH (ref 3.8–10.5)
WBC # FLD AUTO: 14.99 K/UL — HIGH (ref 3.8–10.5)

## 2023-04-07 RX ORDER — IBUPROFEN 200 MG
1 TABLET ORAL
Qty: 0 | Refills: 0 | DISCHARGE
Start: 2023-04-07

## 2023-04-07 RX ORDER — ACETAMINOPHEN 500 MG
3 TABLET ORAL
Qty: 0 | Refills: 0 | DISCHARGE
Start: 2023-04-07

## 2023-04-07 RX ORDER — IBUPROFEN 200 MG
600 TABLET ORAL EVERY 6 HOURS
Refills: 0 | Status: DISCONTINUED | OUTPATIENT
Start: 2023-04-07 | End: 2023-04-09

## 2023-04-07 RX ORDER — ASPIRIN/CALCIUM CARB/MAGNESIUM 324 MG
1 TABLET ORAL
Refills: 0 | DISCHARGE

## 2023-04-07 RX ADMIN — Medication 30 MILLIGRAM(S): at 12:00

## 2023-04-07 RX ADMIN — Medication 975 MILLIGRAM(S): at 15:17

## 2023-04-07 RX ADMIN — Medication 30 MILLIGRAM(S): at 17:04

## 2023-04-07 RX ADMIN — Medication 30 MILLIGRAM(S): at 11:16

## 2023-04-07 RX ADMIN — SIMETHICONE 80 MILLIGRAM(S): 80 TABLET, CHEWABLE ORAL at 06:42

## 2023-04-07 RX ADMIN — Medication 30 MILLIGRAM(S): at 06:33

## 2023-04-07 RX ADMIN — HEPARIN SODIUM 5000 UNIT(S): 5000 INJECTION INTRAVENOUS; SUBCUTANEOUS at 17:04

## 2023-04-07 RX ADMIN — OXYCODONE HYDROCHLORIDE 5 MILLIGRAM(S): 5 TABLET ORAL at 15:00

## 2023-04-07 RX ADMIN — Medication 975 MILLIGRAM(S): at 14:21

## 2023-04-07 RX ADMIN — MAGNESIUM HYDROXIDE 30 MILLILITER(S): 400 TABLET, CHEWABLE ORAL at 22:36

## 2023-04-07 RX ADMIN — Medication 975 MILLIGRAM(S): at 09:02

## 2023-04-07 RX ADMIN — Medication 975 MILLIGRAM(S): at 22:01

## 2023-04-07 RX ADMIN — OXYCODONE HYDROCHLORIDE 5 MILLIGRAM(S): 5 TABLET ORAL at 14:20

## 2023-04-07 RX ADMIN — Medication 975 MILLIGRAM(S): at 21:18

## 2023-04-07 RX ADMIN — SIMETHICONE 80 MILLIGRAM(S): 80 TABLET, CHEWABLE ORAL at 14:20

## 2023-04-07 RX ADMIN — Medication 975 MILLIGRAM(S): at 03:20

## 2023-04-07 RX ADMIN — Medication 975 MILLIGRAM(S): at 02:48

## 2023-04-07 RX ADMIN — HEPARIN SODIUM 5000 UNIT(S): 5000 INJECTION INTRAVENOUS; SUBCUTANEOUS at 06:34

## 2023-04-07 RX ADMIN — Medication 30 MILLIGRAM(S): at 07:35

## 2023-04-07 RX ADMIN — Medication 975 MILLIGRAM(S): at 08:11

## 2023-04-07 RX ADMIN — Medication 30 MILLIGRAM(S): at 17:33

## 2023-04-07 NOTE — DISCHARGE NOTE OB - CARE PLAN
Principal Discharge DX:	Single delivery by  section  Assessment and plan of treatment:	follow up as outpatient in office  regular diet  activity as tolerated and outlined below   1

## 2023-04-07 NOTE — DISCHARGE NOTE OB - PATIENT PORTAL LINK FT
You can access the FollowMyHealth Patient Portal offered by [facility] by registering at the following website: [email or web address]. By joining [employer]’s FollowMyHealth portal, you will also be able to view your health information using other applications (apps) compatible with our system.

## 2023-04-07 NOTE — DISCHARGE NOTE OB - MATERIALS PROVIDED
[facility] [place] Screening Program/Breastfeeding Log/Breastfeeding Mother’s Support Group Information/Guide to Postpartum Care/[facility] Hearing Screen Program/Back To Sleep Handout/Shaken Baby Prevention Handout/Breastfeeding Guide and Packet

## 2023-04-07 NOTE — PROGRESS NOTE ADULT - SUBJECTIVE AND OBJECTIVE BOX
Day 1 of Anesthesia Pain Management Service    SUBJECTIVE: Doing ok  Pain Scale Score:          [X] Refer to charted pain scores    THERAPY:    s/p   100  mcg PF morphine on  4\6\2023      MEDICATIONS  (STANDING):  acetaminophen     Tablet .. 975 milliGRAM(s) Oral <User Schedule>  diphtheria/tetanus/pertussis (acellular) Vaccine (Adacel) 0.5 milliLiter(s) IntraMuscular once  heparin   Injectable 5000 Unit(s) SubCutaneous every 12 hours  ibuprofen  Tablet. 600 milliGRAM(s) Oral every 6 hours  ketorolac   Injectable 30 milliGRAM(s) IV Push every 6 hours  lactated ringers. 1000 milliLiter(s) (125 mL/Hr) IV Continuous <Continuous>  morphine PF Spinal 0.1 milliGRAM(s) IntraThecal. once  oxytocin Infusion 333.333 milliUNIT(s)/Min (1000 mL/Hr) IV Continuous <Continuous>  oxytocin Infusion 333.333 milliUNIT(s)/Min (1000 mL/Hr) IV Continuous <Continuous>    MEDICATIONS  (PRN):  dexAMETHasone  Injectable 4 milliGRAM(s) IV Push every 6 hours PRN Nausea  diphenhydrAMINE 25 milliGRAM(s) Oral every 6 hours PRN Pruritus  lanolin Ointment 1 Application(s) Topical every 6 hours PRN Sore Nipples  magnesium hydroxide Suspension 30 milliLiter(s) Oral two times a day PRN Constipation  nalbuphine Injectable 2.5 milliGRAM(s) IV Push every 6 hours PRN Pruritus  naloxone Injectable 0.1 milliGRAM(s) IV Push every 3 minutes PRN For ANY of the following changes in patient status:  A. Breaths Per Minute LESS THAN 10, B. Oxygen saturation LESS THAN 90%, C. Sedation score of 6 for Stop After: 4 Times  ondansetron Injectable 4 milliGRAM(s) IV Push every 6 hours PRN Nausea  oxyCODONE    IR 5 milliGRAM(s) Oral every 3 hours PRN Moderate to Severe Pain (4-10)  oxyCODONE    IR 5 milliGRAM(s) Oral once PRN Moderate to Severe Pain (4-10)  oxyCODONE    IR 5 milliGRAM(s) Oral every 3 hours PRN Mild Pain (1 - 3)  oxyCODONE    IR 10 milliGRAM(s) Oral every 3 hours PRN Moderate Pain (4 - 6)  simethicone 80 milliGRAM(s) Chew every 4 hours PRN Gas      OBJECTIVE:    Sedation:        	[X] Alert	 [ ] Drowsy	[ ] Arousable      [ ] Asleep       [ ] Unresponsive    Side Effects:	[X] None 	[ ] Nausea	[ ] Vomiting         [ ] Pruritus  		[ ] Weakness            [ ] Numbness	          [ ] Other:    Vital Signs Last 24 Hrs  T(C): 36.8 (07 Apr 2023 08:26), Max: 36.9 (06 Apr 2023 20:03)  T(F): 98.2 (07 Apr 2023 08:26), Max: 98.42 (06 Apr 2023 20:03)  HR: 69 (07 Apr 2023 08:26) (60 - 110)  BP: 104/63 (07 Apr 2023 08:26) (96/55 - 130/77)  BP(mean): 80 (07 Apr 2023 01:15) (73 - 80)  RR: 18 (07 Apr 2023 08:26) (17 - 22)  SpO2: 97% (07 Apr 2023 08:26) (96% - 100%)    Parameters below as of 07 Apr 2023 08:26  Patient On (Oxygen Delivery Method): room air        ASSESSMENT/ PLAN  [X] Patient to be transitioned to prn analgesics after 24 hours  [X] Pain management per primary service, pain service to sign off   [X]Documentation and Verification of current medications

## 2023-04-07 NOTE — DISCHARGE NOTE OB - CARE PROVIDER_API CALL
Yuly Cervantes)  OBSGYN  General  [address] [place] [address], [address] [place] [address]  Phone: [phone]  Fax: [phone]  Follow Up Time:

## 2023-04-07 NOTE — PROGRESS NOTE ADULT - SUBJECTIVE AND OBJECTIVE BOX
Postpartum Note-  Section POD#1    Prenatal Labs  Blood type: O Positive  Rubella IgG:   RPR: Negative          S:Patient c/o moderately controlled incisional pain.  Pt has been OOB once, tolerating PO, not passing flatus. Berger in place draining clear urine. Lochia WNL.     O:  Vital Signs Last 24 Hrs  T(C): 36.3 (2023 06:22), Max: 36.9 (2023 20:03)  T(F): 97.4 (2023 06:22), Max: 98.42 (2023 20:03)  HR: 65 (2023 02:25) (60 - 110)  BP: 103/65 (2023 02:25) (96/55 - 130/77)  BP(mean): 80 (2023 01:15) (73 - 80)  RR: 18 (2023 06:22) (17 - 22)  SpO2: 100% (2023 06:22) (96% - 100%)    Parameters below as of 2023 06:22  Patient On (Oxygen Delivery Method): room air      I&O's Summary    2023 07:01  -  2023 06:56  --------------------------------------------------------  IN: 1000 mL / OUT: 500 mL / NET: 500 mL        Gen: NAD  Abdomen: Softly distended, appropriate tenderness, fundus firm.  Incision: Clean/dry/ intact. no erythema, no ecchymosis   Sub Q    Lochia WNL  Ext:Nontender    LABS:             12.1   7.63  )-----------( 176      ( 06 @ 18:28 )             38.2

## 2023-04-07 NOTE — DISCHARGE NOTE OB - HOSPITAL COURSE
Pt underwent a C/S, bilateral salpingectomies and removal of cerclage without any complications. Her postpartum course was uneventful. She met all her milestones in regards to her vitals, postpartum labs, diet, ambulation, pain management. Follow up discussed. Pt was discharged home on POD#   Pt underwent a C/S, bilateral salpingectomies and removal of cerclage without any complications. Her postpartum course was uneventful. She met all her milestones in regards to her vitals, postpartum labs, diet, ambulation, pain management. Follow up discussed. Pt was discharged home on POD#3.

## 2023-04-07 NOTE — DISCHARGE NOTE OB - NS MD DC FALL RISK RISK
For information on Fall & Injury Prevention, visit: [email or web address] OR  [email or web address] OR  https://www.cdc.gov/steadi/patient.html

## 2023-04-07 NOTE — DISCHARGE NOTE OB - MEDICATION SUMMARY - MEDICATIONS TO TAKE
I will START or STAY ON the medications listed below when I get home from the hospital:    acetaminophen 325 mg oral tablet  -- 3 tab(s) by mouth every 6 hours as needed for  moderate pain  -- Indication: For as needed for pain    ibuprofen 600 mg oral tablet  -- 1 tab(s) by mouth every 6 hours  -- Indication: For as  needed for pain    Prenatal 1 oral capsule  -- 1 cap(s) by mouth once a day  -- Indication: For post partum state    Slow Fe (as elemental iron) 45 mg oral tablet, extended release  -- 1 tab(s) by mouth once a day  -- Indication: For anemia   I will START or STAY ON the medications listed below when I get home from the hospital:    acetaminophen 325 mg oral tablet  -- 3 tab(s) by mouth every 8 hours as needed for  moderate pain  -- Indication: For as needed for pain    ibuprofen 600 mg oral tablet  -- 1 tab(s) by mouth every 6 hours  -- Indication: For as  needed for pain    Prenatal 1 oral capsule  -- 1 cap(s) by mouth once a day  -- Indication: For post partum state    Slow Fe (as elemental iron) 45 mg oral tablet, extended release  -- 1 tab(s) by mouth once a day  -- Indication: For anemia

## 2023-04-07 NOTE — PROGRESS NOTE ADULT - NS ATTEND AMEND GEN_ALL_CORE FT
Patient feels well, pain well controlled  vss  abd soft  inc clean, dry and intact  ext normal  POD#1 stable  Continue post op care.

## 2023-04-08 RX ORDER — OXYCODONE HYDROCHLORIDE 5 MG/1
5 TABLET ORAL
Refills: 0 | Status: DISCONTINUED | OUTPATIENT
Start: 2023-04-08 | End: 2023-04-09

## 2023-04-08 RX ADMIN — Medication 600 MILLIGRAM(S): at 17:36

## 2023-04-08 RX ADMIN — Medication 975 MILLIGRAM(S): at 02:47

## 2023-04-08 RX ADMIN — OXYCODONE HYDROCHLORIDE 5 MILLIGRAM(S): 5 TABLET ORAL at 06:52

## 2023-04-08 RX ADMIN — Medication 975 MILLIGRAM(S): at 15:16

## 2023-04-08 RX ADMIN — Medication 975 MILLIGRAM(S): at 17:15

## 2023-04-08 RX ADMIN — OXYCODONE HYDROCHLORIDE 5 MILLIGRAM(S): 5 TABLET ORAL at 05:56

## 2023-04-08 RX ADMIN — Medication 600 MILLIGRAM(S): at 00:54

## 2023-04-08 RX ADMIN — Medication 600 MILLIGRAM(S): at 12:03

## 2023-04-08 RX ADMIN — Medication 975 MILLIGRAM(S): at 20:30

## 2023-04-08 RX ADMIN — Medication 975 MILLIGRAM(S): at 03:45

## 2023-04-08 RX ADMIN — Medication 975 MILLIGRAM(S): at 12:02

## 2023-04-08 RX ADMIN — Medication 600 MILLIGRAM(S): at 05:56

## 2023-04-08 RX ADMIN — Medication 600 MILLIGRAM(S): at 19:36

## 2023-04-08 RX ADMIN — Medication 600 MILLIGRAM(S): at 06:52

## 2023-04-08 RX ADMIN — HEPARIN SODIUM 5000 UNIT(S): 5000 INJECTION INTRAVENOUS; SUBCUTANEOUS at 05:56

## 2023-04-08 RX ADMIN — HEPARIN SODIUM 5000 UNIT(S): 5000 INJECTION INTRAVENOUS; SUBCUTANEOUS at 17:36

## 2023-04-08 RX ADMIN — Medication 600 MILLIGRAM(S): at 15:13

## 2023-04-08 RX ADMIN — Medication 975 MILLIGRAM(S): at 21:50

## 2023-04-08 RX ADMIN — Medication 975 MILLIGRAM(S): at 08:45

## 2023-04-08 RX ADMIN — Medication 600 MILLIGRAM(S): at 23:43

## 2023-04-08 RX ADMIN — Medication 600 MILLIGRAM(S): at 00:20

## 2023-04-08 NOTE — PROGRESS NOTE ADULT - PROBLEM SELECTOR PLAN 1
#Postpartum state  Increase OOB  PO Pain Protocol  DVT ppx  Dressing removed  Regular diet  AM CBC  Routine Postpartum/Post-op care
#Postpartum State  Increase OOB  PO Pain Protocol  Continue Regular Diet  Continue Routine Postop/Postpartum Care  Discharge Planning

## 2023-04-08 NOTE — PROGRESS NOTE ADULT - SUBJECTIVE AND OBJECTIVE BOX
Postpartum Note-  Section POD#2    Prenatal Labs  Blood type: O Positive  Rubella IgG: Immune  RPR: Negative          S: Patient c/o incisional pain. Passed small amount of flatus.  Pt is OOB, tolerating PO, and voiding. Lochia WNL.     O:  Vital Signs Last 24 Hrs  T(C): 37 (2023 05:17), Max: 37.2 (2023 17:04)  T(F): 98.6 (2023 05:17), Max: 99 (2023 17:04)  HR: 71 (2023 05:17) (62 - 71)  BP: 93/60 (2023 05:17) (93/60 - 108/62)  BP(mean): --  RR: 17 (2023 05:17) (17 - 18)  SpO2: 97% (2023 05:17) (96% - 99%)    Parameters below as of 2023 21:40  Patient On (Oxygen Delivery Method): room air        Gen: NAD  Abdomen: Soft, nontender, non distended, fundus firm.  Incision: Clean/dry/ intact. no erythema, no ecchymosis.   SubQ     Lochia WNL  Ext: Neg calf tenderness    LABS:               11.4   14.99 )-----------( 178      (  @ 06:21 )             37.4                12.1   7.63  )-----------( 176      (  @ 18:28 )             38.2                  Postpartum Note-  Section POD#2    Prenatal Labs  Blood type: O Positive  Rubella IgG: Immune  RPR: Negative          S: Patient c/o incisional pain. Passed small amount of flatus.  Pt is OOB, tolerating PO, and voiding. Lochia WNL.     O:  Vital Signs Last 24 Hrs  T(C): 37 (2023 05:17), Max: 37.2 (2023 17:04)  T(F): 98.6 (2023 05:17), Max: 99 (2023 17:04)  HR: 71 (2023 05:17) (62 - 71)  BP: 93/60 (2023 05:17) (93/60 - 108/62)  BP(mean): --  RR: 17 (2023 05:17) (17 - 18)  SpO2: 97% (2023 05:17) (96% - 99%)    Parameters below as of 2023 21:40  Patient On (Oxygen Delivery Method): room air        Gen: NAD  Abdomen: nontender, softly distended, fundus firm.  Incision: Clean/dry/ intact. no erythema, no ecchymosis.   SubQ     Lochia WNL  Ext: Neg calf tenderness    LABS:               11.4   14.99 )-----------( 178      (  @ 06:21 )             37.4                12.1   7.63  )-----------( 176      (  @ 18:28 )             38.2

## 2023-04-08 NOTE — CHART NOTE - NSCHARTNOTEFT_GEN_A_CORE
2052    At bedside after being notified by RN regarding concerns of purulent drainage coming from right aspect of incision. Patient has not noticed any itzel pus or drainage from her incision. Denies any fevers, chills, significant abdominal pain, or any other complaints     Gen: No acute distress. Awake. Alert  Pulm: Unlabored breathing   Abd: Soft. Non-tender. Uterus firm  Incision: 2052    At bedside after being notified by RN regarding concerns of purulent drainage coming from right aspect of incision. Patient has not noticed any itzel pus or drainage from her incision. Denies any fevers, chills, significant abdominal pain, or any other complaints     Gen: No acute distress. Awake. Alert  Pulm: Unlabored breathing   Abd: Soft. Non-tender. Uterus firm  Incision: Clean, dry, and intact w/ overlying steristrips (dried serous drainage). No itzel pus seen. No erythema around incision    ICU Vital Signs Last 24 Hrs  T(C): 37 (08 Apr 2023 17:00), Max: 37 (08 Apr 2023 05:17)  T(F): 98.6 (08 Apr 2023 17:00), Max: 98.6 (08 Apr 2023 05:17)  HR: 75 (08 Apr 2023 17:00) (64 - 88)  BP: 98/60 (08 Apr 2023 17:00) (93/60 - 102/65)  BP(mean): --  ABP: --  ABP(mean): --  RR: 18 (08 Apr 2023 17:00) (17 - 18)  SpO2: 99% (08 Apr 2023 17:00) (97% - 99%)    O2 Parameters below as of 08 Apr 2023 17:00  Patient On (Oxygen Delivery Method): room air    [facility]: [facility]    At bedside after being notified by RN regarding concerns of purulent drainage coming from right aspect of incision. Patient has not noticed any itzel pus or drainage from her incision. Denies any fevers, chills, significant abdominal pain, or any other complaints     Gen: No acute distress. Awake. Alert  Pulm: Unlabored breathing   Abd: Soft. Non-tender. Uterus firm  Incision: Clean, dry, and intact w/ overlying steristrips (dried serous drainage). No itzel pus seen. No erythema around incision    ICU Vital Signs Last 24 Hrs  T(C): 37 (08 Apr 2023 17:00), Max: 37 (08 Apr 2023 05:17)  T(F): 98.6 (08 Apr 2023 17:00), Max: 98.6 (08 Apr 2023 05:17)  HR: 75 (08 Apr 2023 17:00) (64 - 88)  BP: 98/60 (08 Apr 2023 17:00) (93/60 - 102/65)  BP(mean): --  ABP: --  ABP(mean): --  RR: 18 (08 Apr 2023 17:00) (17 - 18)  SpO2: 99% (08 Apr 2023 17:00) (97% - 99%)    O2 Parameters below as of 08 Apr 2023 17:00  Patient On (Oxygen Delivery Method): room air    A/P: Pt is POD#2 from [facility] w/ reports of purulent drainage by RN. No evidence of purulent discharge or infection on exam. VS reassuring  - Will continue with routine post-op care    Dominick Dickey  PGY-1, Obstetrics & Gynecology

## 2023-04-09 VITALS
HEART RATE: 77 BPM | OXYGEN SATURATION: 100 % | TEMPERATURE: 99 F | RESPIRATION RATE: 18 BRPM | DIASTOLIC BLOOD PRESSURE: 70 MMHG | SYSTOLIC BLOOD PRESSURE: 102 MMHG

## 2023-04-09 PROCEDURE — 86901 BLOOD TYPING SEROLOGIC RH(D): CPT

## 2023-04-09 PROCEDURE — 59025 FETAL NON-STRESS TEST: CPT

## 2023-04-09 PROCEDURE — 88304 TISSUE EXAM BY PATHOLOGIST: CPT

## 2023-04-09 PROCEDURE — 85025 COMPLETE CBC W/AUTO DIFF WBC: CPT

## 2023-04-09 PROCEDURE — 59050 FETAL MONITOR W/REPORT: CPT

## 2023-04-09 PROCEDURE — 88307 TISSUE EXAM BY PATHOLOGIST: CPT

## 2023-04-09 PROCEDURE — 36415 COLL VENOUS BLD VENIPUNCTURE: CPT

## 2023-04-09 PROCEDURE — 86769 SARS-COV-2 COVID-19 ANTIBODY: CPT

## 2023-04-09 PROCEDURE — 86900 BLOOD TYPING SEROLOGIC ABO: CPT

## 2023-04-09 PROCEDURE — 86780 TREPONEMA PALLIDUM: CPT

## 2023-04-09 PROCEDURE — 88302 TISSUE EXAM BY PATHOLOGIST: CPT

## 2023-04-09 PROCEDURE — 86850 RBC ANTIBODY SCREEN: CPT

## 2023-04-09 RX ADMIN — Medication 975 MILLIGRAM(S): at 02:36

## 2023-04-09 RX ADMIN — Medication 600 MILLIGRAM(S): at 05:40

## 2023-04-09 RX ADMIN — Medication 975 MILLIGRAM(S): at 09:30

## 2023-04-09 RX ADMIN — Medication 975 MILLIGRAM(S): at 15:02

## 2023-04-09 RX ADMIN — Medication 975 MILLIGRAM(S): at 03:31

## 2023-04-09 RX ADMIN — Medication 600 MILLIGRAM(S): at 06:46

## 2023-04-09 RX ADMIN — HEPARIN SODIUM 5000 UNIT(S): 5000 INJECTION INTRAVENOUS; SUBCUTANEOUS at 05:40

## 2023-04-09 RX ADMIN — Medication 600 MILLIGRAM(S): at 11:45

## 2023-04-09 RX ADMIN — Medication 600 MILLIGRAM(S): at 00:50

## 2023-04-09 RX ADMIN — Medication 600 MILLIGRAM(S): at 12:31

## 2023-04-09 RX ADMIN — Medication 975 MILLIGRAM(S): at 08:36

## 2023-04-09 NOTE — PROVIDER CONTACT NOTE (OTHER) - REASON
right sided numbness and tingling
right side of incision with steris has small amount of thick yellow/green discharge

## 2023-04-09 NOTE — PROVIDER CONTACT NOTE (OTHER) - BACKGROUND
day 3 c/s with tubal ligation. pt states she had this at the end of pregnancy, was told by her MD that it will resolve after birth but symptoms returned this afternoon.

## 2023-04-09 NOTE — PROVIDER CONTACT NOTE (OTHER) - SITUATION
pt with right leg numbness & tingling spread to right arm, started this late afternoon, able to ambulate w/o complications
right side of incision with steris has small amount of thick yellow/green discharge

## 2023-04-09 NOTE — PROGRESS NOTE ADULT - ASSESSMENT
42y GP POD # 1 S/P  repeat    section with bilateral salpingectomy, cerclage removal. . Pt complaining of some right sided numbness and tingling in hands and feet, most likely positional.   - No calf or popliteal pain  BL  - HCT: 38.2->37.4  - Continue motrin, tylenol, oxycodone PRN for pain control.  - Increase ambulation  - Continue regular diet  - Discharge planning    Tomás Dolan PGY1
  A/P:  42y GP POD # 2 S/P  repeat   section with bilateral salpingectomy, cerclage removal   Doing well
A/P:  42y GP POD # 1 S/P  repeat    section with bilateral salpingectomy, cerclage removal   Doing well

## 2023-04-09 NOTE — PROGRESS NOTE ADULT - ATTENDING COMMENTS
Patient seen and examined. Agree with above. Passing flatus now. Meeting milestones, stable for discharge, home care reviewed. Follow up virtually in 1-2 weeks and in office in 5-6 weeks.     Gen: AAOx3, NAD  Abd: Soft, NT, fundus firm, incision c/d/i with steri strips   Ext: NT
Patient seen and examined, agree with above. Still not passing flatus yet, is burping. Supportive care reviewed, will monitor.     Gen: AAOx3, NAD  Abd: Soft, NT, fundus firm, mild distension, incision c/d/i with steri strips  Ext: NT

## 2023-04-09 NOTE — PROGRESS NOTE ADULT - SUBJECTIVE AND OBJECTIVE BOX
OB Postpartum Note:  Delivery, POD#3    S: 43yo POD#3 s/p LTCS. The patient feels well.  Pain is well controlled. She is tolerating a regular diet and passing flatus. She is voiding spontaneously, and ambulating without difficulty. Denies CP/SOB. Denies lightheadedness/dizziness. Denies N/V.    O:  Vitals:  Vital Signs Last 24 Hrs  T(C): 36.3 (2023 05:59), Max: 37 (2023 17:00)  T(F): 97.4 (2023 05:59), Max: 98.6 (2023 17:00)  HR: 81 (2023 05:59) (71 - 88)  BP: 106/67 (2023 05:59) (98/60 - 106/67)  BP(mean): --  RR: 18 (2023 05:59) (18 - 18)  SpO2: 99% (2023 05:59) (99% - 99%)    Parameters below as of 2023 05:59  Patient On (Oxygen Delivery Method): room air        MEDICATIONS  (STANDING):  acetaminophen     Tablet .. 975 milliGRAM(s) Oral <User Schedule>  diphtheria/tetanus/pertussis (acellular) Vaccine (Adacel) 0.5 milliLiter(s) IntraMuscular once  heparin   Injectable 5000 Unit(s) SubCutaneous every 12 hours  ibuprofen  Tablet. 600 milliGRAM(s) Oral every 6 hours  lactated ringers. 1000 milliLiter(s) (125 mL/Hr) IV Continuous <Continuous>  oxytocin Infusion 333.333 milliUNIT(s)/Min (1000 mL/Hr) IV Continuous <Continuous>  oxytocin Infusion 333.333 milliUNIT(s)/Min (1000 mL/Hr) IV Continuous <Continuous>    MEDICATIONS  (PRN):  diphenhydrAMINE 25 milliGRAM(s) Oral every 6 hours PRN Pruritus  lanolin Ointment 1 Application(s) Topical every 6 hours PRN Sore Nipples  magnesium hydroxide Suspension 30 milliLiter(s) Oral two times a day PRN Constipation  oxyCODONE    IR 5 milliGRAM(s) Oral once PRN Moderate to Severe Pain (4-10)  oxyCODONE    IR 5 milliGRAM(s) Oral every 3 hours PRN Moderate to Severe Pain (4-10)  simethicone 80 milliGRAM(s) Chew every 4 hours PRN Gas      LABS:  Blood type: O Positive  Rubella IgG: RPR: Negative                          11.4<L>   14.99<H> >-----------< 178    (  @ 06:21 )             37.4                        12.1   7.63 >-----------< 176    (  @ 18:28 )             38.2                  Physical exam:  Gen: NAD  Abdomen: Soft, nontender, no distension , firm uterine fundus at umbilicus.  Incision: Clean, dry, and intact   Pelvic: Normal lochia noted  Ext: No calf tenderness

## 2023-05-24 NOTE — OB RN PATIENT PROFILE - NS_SUPPORTPERSONNAME_OBGYN_ALL_OB_FT
Medication refills ordered this visit: Meds to be delivered today    Medications reconciled and all medications are available in the home this visit. The following education was provided regarding medications, medication interactions, and look alike medications: Medication side effects, dosages, purposes, frequencies. Response to teaching: Verbalized understanding. Medications are effective at this time. Supplies by type and quantity ordered this visit include: Ostomy bags mayur 95670 70mm 2 3/4 in, wipes, disposable washcloths---Order Number : [id number]    Consulted medical director/attending physician regarding: Not needed    Instructed patient/family/caregiver on 24-hour hospice availability and phone number. Plan for next visit: Continue end of life education and support caregiver. Timmy Chalino

## 2023-07-10 NOTE — OB RN INTRAOPERATIVE NOTE - NS_DURAMORPH_OBGYN_ALL_OB_DT
No care due was identified.  [facility] Embedded Care Due Messages. Reference number: [id number].   7/10/2023 2:57:13 PM CDT  
06-Apr-2023 21:32

## 2024-01-18 NOTE — ED PROVIDER NOTE - PRO INTERPRETER NEED 2
Medication: Lorazepam 1mg    Provider: Cristy Davila MD  Preferred Contact Number:[phone] (mobile)    [address] [place] [address]    Patient instructed to call pharmacy directly for future refills. Advised patient that the nurse will call if there are questions or concerns, otherwise refill processing may take 48-72 hours. English

## 2025-03-19 NOTE — OB RN PATIENT PROFILE - CAREGIVER RELATION TO PATIENT
Medication Name: Lantus SoloStar        Medication is covered and was last filled on 03/14/2025. No PA needed at this time.    Covered by insurance, Medication Prior Authorization team will close this encounter     spouse

## 2025-06-16 NOTE — OB PROVIDER H&P - CURRENT PREGNANCY COMPLICATIONS, OB PROFILE
Incompetent Cervix/Cervical Insufficiency/ Premature Rupture of Membranes (PPROM)/Abnormal Amniotic Fluid Volume
25

## 2025-07-25 ENCOUNTER — NON-APPOINTMENT (OUTPATIENT)
Age: 45
End: 2025-07-25